# Patient Record
Sex: MALE | Race: WHITE | URBAN - METROPOLITAN AREA
[De-identification: names, ages, dates, MRNs, and addresses within clinical notes are randomized per-mention and may not be internally consistent; named-entity substitution may affect disease eponyms.]

---

## 2017-01-10 ENCOUNTER — OFFICE VISIT (OUTPATIENT)
Dept: FAMILY MEDICINE CLINIC | Age: 57
End: 2017-01-10

## 2017-01-10 VITALS
TEMPERATURE: 97 F | DIASTOLIC BLOOD PRESSURE: 72 MMHG | HEART RATE: 86 BPM | BODY MASS INDEX: 21.4 KG/M2 | HEIGHT: 72 IN | RESPIRATION RATE: 20 BRPM | WEIGHT: 158 LBS | OXYGEN SATURATION: 96 % | SYSTOLIC BLOOD PRESSURE: 116 MMHG

## 2017-01-10 DIAGNOSIS — I10 ESSENTIAL HYPERTENSION: ICD-10-CM

## 2017-01-10 DIAGNOSIS — J44.1 COPD WITH EXACERBATION (HCC): ICD-10-CM

## 2017-01-10 DIAGNOSIS — G89.29 CHRONIC MIDLINE LOW BACK PAIN WITH SCIATICA, SCIATICA LATERALITY UNSPECIFIED: Primary | ICD-10-CM

## 2017-01-10 DIAGNOSIS — M54.40 CHRONIC MIDLINE LOW BACK PAIN WITH SCIATICA, SCIATICA LATERALITY UNSPECIFIED: Primary | ICD-10-CM

## 2017-01-10 RX ORDER — BENAZEPRIL HYDROCHLORIDE AND HYDROCHLOROTHIAZIDE 20; 12.5 MG/1; MG/1
1 TABLET ORAL DAILY
Qty: 30 TAB | Refills: 5 | Status: CANCELLED | OUTPATIENT
Start: 2017-01-10

## 2017-01-10 RX ORDER — ALBUTEROL SULFATE 90 UG/1
2 AEROSOL, METERED RESPIRATORY (INHALATION)
Qty: 1 INHALER | Refills: 5 | Status: SHIPPED | OUTPATIENT
Start: 2017-01-10 | End: 2017-09-22 | Stop reason: SDUPTHER

## 2017-01-10 RX ORDER — AMLODIPINE BESYLATE 5 MG/1
5 TABLET ORAL DAILY
Qty: 30 TAB | Refills: 5 | Status: SHIPPED | OUTPATIENT
Start: 2017-01-10 | End: 2017-08-12 | Stop reason: SDUPTHER

## 2017-01-10 RX ORDER — OXYCODONE AND ACETAMINOPHEN 10; 325 MG/1; MG/1
1 TABLET ORAL
Qty: 60 TAB | Refills: 0 | Status: SHIPPED | OUTPATIENT
Start: 2017-01-10 | End: 2017-02-09 | Stop reason: SDUPTHER

## 2017-01-10 RX ORDER — GABAPENTIN 300 MG/1
CAPSULE ORAL
Qty: 90 CAP | Refills: 5 | Status: SHIPPED | OUTPATIENT
Start: 2017-01-10 | End: 2017-09-22 | Stop reason: SDUPTHER

## 2017-01-10 NOTE — PROGRESS NOTES
Dell Burger 49 y.o. male   Chief Complaint   Patient presents with    Follow-up     2 month f/u    Hypertension    COPD    Pain (Chronic)     Pain scale today 7/10    Medication Refill    Medication Evaluation     Discuss dosage increase of Gabapentin         1. Have you been to the ER, urgent care clinic since your last visit? Hospitalized since your last visit? No    2. Have you seen or consulted any other health care providers outside of the [address] [place] [address] since your last visit? Include any pap smears or colon screening.  No

## 2017-01-10 NOTE — PROGRESS NOTES
HISTORY OF PRESENT ILLNESS  Ana Rosa Franz is a 49 y.o. male. Chief Complaint   Patient presents with    Follow-up     2 month f/u    Hypertension    COPD    Pain (Chronic)     Pain scale today 7/10    Medication Refill    Medication Evaluation     Discuss dosage increase of Gabapentin       HPI  Pt is here for follow up of COPD,HTN,and chronic pain. Pt does need medication refills today. Pt requests electronic refills. New concerns today: Pt would like to increase the dosage of the gabapentin. He has been taking gabapentin 600mg qhs. It make a big difference in his sx in his feet. This is very noticeable if he forgets a dose. Pt states that he has decreased his smoking dramatically. He is down from 2 ppd in early Nov 2016 to just 2-3 cigarettes per day. Some days he does not smoke at all. Pt reports that his breathing is much better with his current meds. He is using albuterol regularly but has not been using the advair due to cost.      Review of Systems   Constitutional: Negative. HENT: Negative. Respiratory: Negative. Cardiovascular: Negative. Musculoskeletal: Positive for back pain. Radiation of pain from back to legs   All other systems reviewed and are negative. Physical Exam  Nursing note and vitals reviewed. Constitutional: He is oriented to person, place, and time. He appears well-developed and well-nourished. HENT:   Head: Normocephalic and atraumatic. Right Ear: External ear normal.   Left Ear: External ear normal.   Nose: Nose normal.   Eyes: Conjunctivae and EOM are normal.   Neck: Normal range of motion. Neck supple. No JVD present. Carotid bruit is not present. No thyromegaly present. Cardiovascular: Normal rate, regular rhythm, normal heart sounds and intact distal pulses. Exam reveals no gallop and no friction rub. No murmur heard. Pulmonary/Chest: Effort normal and breath sounds normal. He has no wheezes. He has no rhonchi.  He has no rales. Abdominal: Soft. Musculoskeletal: Normal range of motion. Neurological: He is alert and oriented to person, place, and time. Coordination normal.   Skin: Skin is warm and dry. Psychiatric: He has a normal mood and affect. His behavior is normal. Judgment and thought content normal.       ASSESSMENT and PLAN  Dian Leon was seen today for follow-up, hypertension, copd, pain (chronic), medication refill and medication evaluation. Diagnoses and all orders for this visit:    Chronic midline low back pain with sciatica, sciatica laterality unspecified  -     oxyCODONE-acetaminophen (PERCOCET 10)  mg per tablet; Take 1 Tab by mouth two (2) times daily as needed for Pain. Max Daily Amount: 2 Tabs. -     gabapentin (NEURONTIN) 300 mg capsule; Take 300mg by mouth each morning; take 600mg by mouth each evening. COPD with exacerbation ([address]  -     VENTOLIN HFA 90 mcg/actuation inhaler; Take 2 Puffs by inhalation every four (4) hours as needed for Wheezing. Will research more affordable tx options for pt. Essential hypertension  -     amLODIPine (NORVASC) 5 mg tablet; Take 1 Tab by mouth daily. Stable, cont pres tx plan.            Follow-up Disposition: 1 month; sooner prn

## 2017-02-09 ENCOUNTER — OFFICE VISIT (OUTPATIENT)
Dept: FAMILY MEDICINE CLINIC | Age: 57
End: 2017-02-09

## 2017-02-09 VITALS
RESPIRATION RATE: 20 BRPM | WEIGHT: 156 LBS | HEIGHT: 72 IN | BODY MASS INDEX: 21.13 KG/M2 | TEMPERATURE: 98.1 F | SYSTOLIC BLOOD PRESSURE: 143 MMHG | DIASTOLIC BLOOD PRESSURE: 76 MMHG | OXYGEN SATURATION: 94 % | HEART RATE: 88 BPM

## 2017-02-09 DIAGNOSIS — M54.40 CHRONIC MIDLINE LOW BACK PAIN WITH SCIATICA, SCIATICA LATERALITY UNSPECIFIED: Primary | ICD-10-CM

## 2017-02-09 DIAGNOSIS — Z72.0 TOBACCO USE: ICD-10-CM

## 2017-02-09 DIAGNOSIS — J44.9 CHRONIC OBSTRUCTIVE PULMONARY DISEASE, UNSPECIFIED COPD TYPE (HCC): ICD-10-CM

## 2017-02-09 DIAGNOSIS — G89.29 CHRONIC MIDLINE LOW BACK PAIN WITH SCIATICA, SCIATICA LATERALITY UNSPECIFIED: Primary | ICD-10-CM

## 2017-02-09 DIAGNOSIS — I10 ESSENTIAL HYPERTENSION: ICD-10-CM

## 2017-02-09 DIAGNOSIS — Z23 ENCOUNTER FOR IMMUNIZATION: ICD-10-CM

## 2017-02-09 RX ORDER — VARENICLINE TARTRATE 25 MG
KIT ORAL
Qty: 1 DOSE PACK | Refills: 0 | Status: SHIPPED | OUTPATIENT
Start: 2017-02-09 | End: 2017-03-16 | Stop reason: ALTCHOICE

## 2017-02-09 RX ORDER — OXYCODONE AND ACETAMINOPHEN 10; 325 MG/1; MG/1
1 TABLET ORAL
Qty: 60 TAB | Refills: 0 | Status: SHIPPED | OUTPATIENT
Start: 2017-02-09 | End: 2017-03-07 | Stop reason: SDUPTHER

## 2017-02-09 NOTE — PROGRESS NOTES
Chief Complaint   Patient presents with    Pain (Chronic)     back pain, states pain med is effective, has pt with pain management 3/16/17    Hypertension    COPD     HISTORY OF PRESENT ILLNESS  Sina Jose is a 49 y.o. male. HPI  Pt here for f/u. Pt reports that he recently had the flu. He has improved. Pt is still smoking 2-3 cigarettes per day but is having trouble stopping. He would like to try chantix. He has good support at home from his wife as she stopped smoking about 1 yr ago. Pt will see pain management next month. Pt is taking the gabapentin as rx'ed. He notes very good improvement in the sx in his feet. He does need a refill of the percocet for his back pain. ROS  Review of Systems   Constitutional: Negative. HENT: Negative. Respiratory: Negative. Cardiovascular: Negative. All other systems reviewed and are negative. Physical Exam  Nursing note and vitals reviewed. Constitutional: He is oriented to person, place, and time. He appears well-developed and well-nourished. HENT:   Head: Normocephalic and atraumatic. Right Ear: External ear normal.   Left Ear: External ear normal.   Nose: Nose normal.   Eyes: Conjunctivae and EOM are normal.   Neck: Normal range of motion. Neck supple. No JVD present. Carotid bruit is not present. No thyromegaly present. Cardiovascular: Normal rate, regular rhythm, normal heart sounds and intact distal pulses. Exam reveals no gallop and no friction rub. No murmur heard. Pulmonary/Chest: Effort normal and breath sounds normal. He has no wheezes. He has no rhonchi. He has no rales. Abdominal: Soft. Musculoskeletal: Normal range of motion. Neurological: He is alert and oriented to person, place, and time. Coordination normal.   Skin: Skin is warm and dry. Psychiatric: He has a normal mood and affect.  His behavior is normal. Judgment and thought content normal.       ASSESSMENT and PLAN  Ane Lobe was seen today for pain (chronic), hypertension and copd. Diagnoses and all orders for this visit:    Chronic midline low back pain with sciatica, sciatica laterality unspecified  -     oxyCODONE-acetaminophen (PERCOCET 10)  mg per tablet; Take 1 Tab by mouth two (2) times daily as needed for Pain. Max Daily Amount: 2 Tabs. Chronic obstructive pulmonary disease, unspecified COPD type ([facility]  Stable, cont pres tx plan. Essential hypertension  Stable, cont pres tx plan. Tobacco use  -     varenicline (CHANTIX STARTER YULISA) 0.5 mg (11)- 1 mg (42) DsPk; Use as directed. Encounter for immunization  -     Influenza virus vaccine (QUADRIVALENT PRES FREE SYRINGE) IM 3 years and older  -     Pneumococcal polysaccharide vaccine, 23-valent, adult or immunosuppressed pt dose      Follow-up Disposition: pt to call for appt when he decides on his quit date so that he has a visit prior to running out of chantix.

## 2017-02-09 NOTE — PROGRESS NOTES
Chief Complaint   Patient presents with    Pain (Chronic)     back pain, states pain med is effective, has pt with pain management 3/16/17    Hypertension    COPD     1. Have you been to the ER, urgent care clinic since your last visit? Hospitalized since your last visit? Yes When: 3 weeks ago Where: [facility] Reason for visit: influenza    2. Have you seen or consulted any other health care providers outside of the [address] [place] [address] since your last visit? Include any pap smears or colon screening. No  Patient given VIS information and signed consent form. Immunization/s administered, patient tolerated procedure well. There was no reaction noted after observed for 5 minutes.

## 2017-03-07 DIAGNOSIS — G89.29 CHRONIC MIDLINE LOW BACK PAIN WITH SCIATICA, SCIATICA LATERALITY UNSPECIFIED: ICD-10-CM

## 2017-03-07 DIAGNOSIS — M54.40 CHRONIC MIDLINE LOW BACK PAIN WITH SCIATICA, SCIATICA LATERALITY UNSPECIFIED: ICD-10-CM

## 2017-03-07 RX ORDER — OXYCODONE AND ACETAMINOPHEN 10; 325 MG/1; MG/1
1 TABLET ORAL
Qty: 30 TAB | Refills: 0 | Status: SHIPPED | OUTPATIENT
Start: 2017-03-07

## 2017-03-16 ENCOUNTER — OFFICE VISIT (OUTPATIENT)
Dept: PAIN MANAGEMENT | Age: 57
End: 2017-03-16

## 2017-03-16 VITALS
BODY MASS INDEX: 21.84 KG/M2 | WEIGHT: 161 LBS | SYSTOLIC BLOOD PRESSURE: 151 MMHG | HEART RATE: 79 BPM | DIASTOLIC BLOOD PRESSURE: 91 MMHG

## 2017-03-16 DIAGNOSIS — G89.4 CHRONIC PAIN SYNDROME: ICD-10-CM

## 2017-03-16 DIAGNOSIS — G62.9 NEUROPATHY: ICD-10-CM

## 2017-03-16 DIAGNOSIS — M54.41 CHRONIC MIDLINE LOW BACK PAIN WITH BILATERAL SCIATICA: Primary | ICD-10-CM

## 2017-03-16 DIAGNOSIS — M79.2 NEUROPATHIC PAIN: ICD-10-CM

## 2017-03-16 DIAGNOSIS — M15.9 GENERALIZED OA: ICD-10-CM

## 2017-03-16 DIAGNOSIS — M54.41 CHRONIC MIDLINE LOW BACK PAIN WITH BILATERAL SCIATICA: ICD-10-CM

## 2017-03-16 DIAGNOSIS — G89.29 CHRONIC MIDLINE LOW BACK PAIN WITH BILATERAL SCIATICA: ICD-10-CM

## 2017-03-16 DIAGNOSIS — I10 ESSENTIAL HYPERTENSION: ICD-10-CM

## 2017-03-16 DIAGNOSIS — Z79.899 ENCOUNTER FOR LONG-TERM (CURRENT) USE OF MEDICATIONS: ICD-10-CM

## 2017-03-16 DIAGNOSIS — G89.29 CHRONIC MIDLINE LOW BACK PAIN WITH BILATERAL SCIATICA: Primary | ICD-10-CM

## 2017-03-16 DIAGNOSIS — I73.9 INTERMITTENT CLAUDICATION (HCC): ICD-10-CM

## 2017-03-16 DIAGNOSIS — M47.27 OSTEOARTHRITIS OF SPINE WITH RADICULOPATHY, LUMBOSACRAL REGION: ICD-10-CM

## 2017-03-16 DIAGNOSIS — J43.9 PULMONARY EMPHYSEMA, UNSPECIFIED EMPHYSEMA TYPE (HCC): ICD-10-CM

## 2017-03-16 DIAGNOSIS — M54.42 CHRONIC MIDLINE LOW BACK PAIN WITH BILATERAL SCIATICA: ICD-10-CM

## 2017-03-16 DIAGNOSIS — M25.50 POLYARTHRALGIA: ICD-10-CM

## 2017-03-16 DIAGNOSIS — M54.42 CHRONIC MIDLINE LOW BACK PAIN WITH BILATERAL SCIATICA: Primary | ICD-10-CM

## 2017-03-16 LAB
ALCOHOL UR POC: NORMAL
AMPHETAMINES UR POC: NEGATIVE
BARBITURATES UR POC: NEGATIVE
BENZODIAZEPINES UR POC: NEGATIVE
BUPRENORPHINE UR POC: NORMAL
CANNABINOIDS UR POC: NEGATIVE
CARISOPRODOL UR POC: NORMAL
COCAINE UR POC: NEGATIVE
FENTANYL UR POC: NORMAL
MDMA/ECSTASY UR POC: NEGATIVE
METHADONE UR POC: NEGATIVE
METHAMPHETAMINE UR POC: NEGATIVE
METHYLPHENIDATE UR POC: NEGATIVE
OPIATES UR POC: NEGATIVE
OXYCODONE UR POC: NORMAL
PHENCYCLIDINE UR POC: NEGATIVE
PROPOXYPHENE UR POC: NORMAL
TRAMADOL UR POC: NORMAL
TRICYCLICS UR POC: NEGATIVE

## 2017-03-16 RX ORDER — OXYCODONE AND ACETAMINOPHEN 10; 325 MG/1; MG/1
1 TABLET ORAL
Qty: 120 TAB | Refills: 0 | Status: SHIPPED | OUTPATIENT
Start: 2017-03-16 | End: 2017-04-15

## 2017-03-16 RX ORDER — OXYCODONE AND ACETAMINOPHEN 10; 325 MG/1; MG/1
1 TABLET ORAL
Qty: 120 TAB | Refills: 0 | Status: SHIPPED | OUTPATIENT
Start: 2017-04-14 | End: 2017-05-14

## 2017-03-16 RX ORDER — DICLOFENAC SODIUM 75 MG/1
75 TABLET, DELAYED RELEASE ORAL 2 TIMES DAILY
Qty: 60 TAB | Refills: 5 | Status: SHIPPED | OUTPATIENT
Start: 2017-03-16 | End: 2017-04-15

## 2017-03-16 NOTE — MR AVS SNAPSHOT
Visit Information Date & Time Provider Department Dept. Phone Encounter #  
 3/16/2017  9:20 AM Henny Naylor MD [address] [place] [address] for Pain Management   Follow-up Instructions Return in about 2 months (around 5/16/2017). Upcoming Health Maintenance Date Due Hepatitis C Screening 1960 DTaP/Tdap/Td series (1 - Tdap) 3/31/1983 FOBT Q 1 YEAR AGE 50-75 3/31/2012 Allergies as of 3/16/2017  Review Complete On: 3/16/2017 By: Esteban Hernandez RN No Known Allergies Current Immunizations  Never Reviewed Name Date Influenza Vaccine (Quad) PF 2/9/2017 Pneumococcal Polysaccharide (PPSV-23) 2/9/2017 Not reviewed this visit You Were Diagnosed With   
  
 Codes Comments Encounter for long-term (current) use of medications    -  Primary ICD-10-CM: E80.125 ICD-9-CM: V58.69 Chronic midline low back pain with bilateral sciatica     ICD-10-CM: M54.41, M54.42, G89.29 ICD-9-CM: 724.2, 724.3, 338.29 Osteoarthritis of spine with radiculopathy, lumbosacral region     ICD-10-CM: M47.27 ICD-9-CM: 721.3 Pulmonary emphysema, unspecified emphysema type ([facility]     ICD-10-CM: J43.9 ICD-9-CM: 492.8 Essential hypertension     ICD-10-CM: I10 
ICD-9-CM: 401.9 Polyarthralgia     ICD-10-CM: M25.50 ICD-9-CM: 719.49 Generalized OA     ICD-10-CM: M15.9 ICD-9-CM: 715.00 Neuropathy     ICD-10-CM: G62.9 ICD-9-CM: 355.9 Neuropathic pain     ICD-10-CM: M79.2 ICD-9-CM: 729.2 Chronic pain syndrome     ICD-10-CM: G89.4 ICD-9-CM: 338.4 Intermittent claudication (HCC)     ICD-10-CM: I73.9 ICD-9-CM: 443. 9 Vitals BP Pulse Weight(growth percentile) BMI Smoking Status (!) 151/91 79 161 lb (73 kg) 21.84 kg/m2 Current Every Day Smoker BMI and BSA Data Body Mass Index Body Surface Area  
 21.84 kg/m 2 1.93 m 2 Preferred Pharmacy Pharmacy Name Phone [facility] [address] [place] [address] Your Updated Medication List  
  
   
This list is accurate as of: 3/16/17 11:03 AM.  Always use your most recent med list.  
  
  
  
  
 * albuterol 2.5 mg /3 mL (0.083 %) nebulizer solution Commonly known as:  PROVENTIL VENTOLIN  
3 mL by Nebulization route every four (4) hours as needed for Wheezing. * VENTOLIN HFA 90 mcg/actuation inhaler Generic drug:  albuterol Take 2 Puffs by inhalation every four (4) hours as needed for Wheezing. amLODIPine 5 mg tablet Commonly known as:  Shae Blade Take 1 Tab by mouth daily. budesonide 180 mcg/actuation Aepb inhaler Commonly known as:  PULMICORT Take 2 Puffs by inhalation two (2) times a day. diclofenac EC 75 mg EC tablet Commonly known as:  VOLTAREN Take 1 Tab by mouth two (2) times a day for 30 days. Indications: OSTEOARTHRITIS  
  
 gabapentin 300 mg capsule Commonly known as:  NEURONTIN Take 300mg by mouth each morning; take 600mg by mouth each evening. OTHER Lumbar DDS Traction Belt * oxyCODONE-acetaminophen  mg per tablet Commonly known as:  PERCOCET 10 Take 1 Tab by mouth two (2) times daily as needed for Pain. Max Daily Amount: 2 Tabs. * oxyCODONE-acetaminophen  mg per tablet Commonly known as:  PERCOCET Take 1 Tab by mouth four (4) times daily as needed for Pain for up to 30 days. Max Daily Amount: 4 Tabs. Indications: Pain * oxyCODONE-acetaminophen  mg per tablet Commonly known as:  PERCOCET Take 1 Tab by mouth four (4) times daily as needed for Pain for up to 30 days. Max Daily Amount: 4 Tabs. Indications: Pain Start taking on:  4/14/2017 TENS unit and electrodes Cmpk 1 Device by Does Not Apply route as needed for up to 30 days. * Notice: This list has 5 medication(s) that are the same as other medications prescribed for you.  Read the directions carefully, and ask your doctor or other care provider to review them with you. Prescriptions Printed Refills TENS unit and electrodes cmpk prn Si Device by Does Not Apply route as needed for up to 30 days. Class: Print Route: Does Not Apply OTHER prn Sig: Lumbar DDS Traction Belt Class: Print  
 oxyCODONE-acetaminophen (PERCOCET)  mg per tablet 0 Starting on: 2017 Sig: Take 1 Tab by mouth four (4) times daily as needed for Pain for up to 30 days. Max Daily Amount: 4 Tabs. Indications: Pain Class: Print Route: Oral  
 oxyCODONE-acetaminophen (PERCOCET)  mg per tablet 0 Sig: Take 1 Tab by mouth four (4) times daily as needed for Pain for up to 30 days. Max Daily Amount: 4 Tabs. Indications: Pain Class: Print Route: Oral  
  
Prescriptions Sent to Pharmacy Refills  
 diclofenac EC (VOLTAREN) 75 mg EC tablet 5 Sig: Take 1 Tab by mouth two (2) times a day for 30 days. Indications: OSTEOARTHRITIS Class: Normal  
 Pharmacy: [address], [place] Ph #: [phone] Route: Oral  
  
We Performed the Following AMB POC DRUG SCREEN () [ [facility]] DRUG SCREEN [[id number] Custom] Follow-up Instructions Return in about 2 months (around 2017). To-Do List   
 Around 2017 Lab:  ADRIEN, DIRECT, W/REFLEX Around 2017 Lab:  ANGIOTENSIN CONVERTING ENZYME Around 2017 Lab:  COMPLEMENT, C3 Around 2017 Lab:  COMPLEMENT, C4 Around 2017 Lab:  COMPLEMENT, CH50, TOTAL Around 2017 Lab:  CRP, HIGH SENSITIVITY Around 2017 Lab:  CYCLIC CITRUL PEPTIDE AB, IGG Around 2017 Lab:  AMINAH + DSDNA Around 2017 Lab:  HEPATITIS C AB Around 2017 Lab:  HLA-B27 Around 2017 Lab:  INTERLEUKIN 6 Around 2017 Imaging:  LOWER EXT ART PVR MULT LEVEL SEG PRESSURES Around 03/16/2017 Lab:  LYME AB TOTAL W/RFLX W BLOT Around 03/16/2017 Imaging:  NM BONE SCAN [facility] BODY Around 03/16/2017 Lab:  RHEUMATOID FACTOR, QL Around 03/16/2017 Lab:  SED RATE (ESR) Around 03/16/2017 Lab:  T4 Around 03/16/2017 Lab:  TSH 3RD GENERATION Around 03/16/2017 Lab:  VITAMIN B12 & FOLATE Referral Information Referral ID Referred By Referred To  
  
 [id number] Waqar Valdivia Not Available Visits Status Start Date End Date 1 New Request 3/16/17 3/16/18 If your referral has a status of pending review or denied, additional information will be sent to support the outcome of this decision. Patient Instructions Current health maintenance issues were reviewed and the patient was advised to followup with his/her PCP for completion of these items. Introducing [facility]! Miranda Middleton introduces NextUser patient portal. Now you can access parts of your medical record, email your doctor's office, and request medication refills online. 1. In your internet browser, go to [email or web address] 2. Click on the First Time User? Click Here link in the Sign In box. You will see the New Member Sign Up page. 3. Enter your NextUser Access Code exactly as it appears below. You will not need to use this code after youve completed the sign-up process. If you do not sign up before the expiration date, you must request a new code. · NextUser Access Code: [email or web address] Expires: 5/10/2017 11:04 AM 
 
4. Enter the last four digits of your Social Security Number (xxxx) and Date of Birth (mm/dd/yyyy) as indicated and click Submit. You will be taken to the next sign-up page. 5. Create a NextUser ID. This will be your NextUser login ID and cannot be changed, so think of one that is secure and easy to remember. 6. Create a Pulse password. You can change your password at any time. 7. Enter your Password Reset Question and Answer. This can be used at a later time if you forget your password. 8. Enter your e-mail address. You will receive e-mail notification when new information is available in [address] 9. Click Sign Up. You can now view and download portions of your medical record. 10. Click the Download Summary menu link to download a portable copy of your medical information. If you have questions, please visit the Frequently Asked Questions section of the Pulse website. Remember, Pulse is NOT to be used for urgent needs. For medical emergencies, dial 911. Now available from your iPhone and Android! Please provide this summary of care documentation to your next provider. Lyme Disease Testing Disclaimer:   
 § 27.4-8426.9. (Expires July 1, 2018) Lyme disease testing information disclosure. A. Every licensee or his in-office designee who orders a laboratory test for the presence of Lyme disease shall provide to the patient or his legal representative the following written information: \"ACCORDING TO THE CENTERS FOR DISEASE CONTROL AND PREVENTION, AS OF 2011 LYME DISEASE IS THE SIXTH FASTEST GROWING DISEASE IN THE [place]. YOUR HEALTH CARE PROVIDER HAS ORDERED A LABORATORY TEST FOR THE PRESENCE OF LYME DISEASE FOR YOU. CURRENT LABORATORY TESTING FOR LYME DISEASE CAN BE PROBLEMATIC AND STANDARD LABORATORY TESTS OFTEN RESULT IN FALSE NEGATIVE AND FALSE POSITIVE RESULTS, AND IF DONE TOO EARLY, YOU MAY NOT HAVE PRODUCED ENOUGH ANTIBODIES TO BE CONSIDERED POSITIVE BECAUSE YOUR IMMUNE RESPONSE REQUIRES TIME TO DEVELOP ANTIBODIES. IF YOU ARE TESTED FOR LYME DISEASE, AND THE RESULTS ARE NEGATIVE, THIS DOES NOT NECESSARILY MEAN YOU DO NOT HAVE LYME DISEASE.  IF YOU CONTINUE TO EXPERIENCE SYMPTOMS, YOU SHOULD CONTACT YOUR HEALTH CARE PROVIDER AND INQUIRE ABOUT THE APPROPRIATENESS OF RETESTING OR ADDITIONAL TREATMENT. \"  
B. Licensees shall be immune from civil liability for the provision of the written information required by this section absent gross negligence or willful misconduct. Your primary care clinician is listed as Governor Babar. If you have any questions after today's visit, please call [phone].

## 2017-03-16 NOTE — PROGRESS NOTES
HISTORY OF PRESENT ILLNESS  Slava Iglesias is a 49 y.o. male. Left handed  HPI he is seen in [facility]. He is referred for evaluation treatment of multiple pain complaints including low back pain which is predominantly nonradiating, distal lower extremity pain associated with numbness and tingling, and generalized polyarticular pain. Extensive external medical records pertaining to his prior treatment were reviewed. He relates the onset of his low back problems to an injury which she sustained many many years ago when he was pushing a boat off of a sand bar. Over the course of the many years he has been treated with physical therapy epidural steroid injections's and sacroiliac joint injections which have not provided any benefit. He has undergone an MRI of the lumbar spine on 5/30/14 which was remarkable for the following: At L3-4, there is a broad disc bulge with narrowing of the neural canal.  At L4-5, there is a broad disc bulge with small central disc protrusion and mild bilateral neuroforaminal stenosis. At L5-S1 there is a central disc protrusion with osteophytosis. An EMG and nerve conduction study of the lumbar spine and lower extremities was also reviewed and felt consistent with bilateral lumbar radiculopathy. He had been treated by a local orthopedist  He reports that his pain is continuous throughout the day and is alleviated by doing home exercises. He has been treated with multiple medications in the past with  Little success including hydrocodone Flexeril Mobic Lidoderm compound ointments. He does relate having some relief with the use of Percocet and does find that gabapentin has been helpful.     Associated symptoms: Fatigue, snoring, blurry vision, hearing loss, vertigo, imbalance, cough, shortness of breath, cold hands and feet, erectile dysfunction, numbness and tingling in the legs, incoordination and difficulty walking, joint pain.    Past history: COPD, hypertension, question of neuropathy of uncertain etiology. Family history: Diabetes, hypertension, seizures. Social history: He is  and is employed full-time doing automotive work. He is a 3-4 cigarettes per day smoker and a rare drinker. Denies any current or recent recreational substance usage. Low risk is identified on the opioid risk screening tool. On the PHQ-9, no significant depressive affect is noted. A PCS score of 10 is not consistent with significant catastrophic behaviors. A review of the [place] prescription monitoring program does not identify any inconsistency. UDS obtained and reviewed; formal confirmation from laboratory is pending. A salivary fluid specimen is obtained and forwarded to the laboratory for cytogenetic analysis    Review of Systems   Constitutional: Positive for malaise/fatigue. Gastrointestinal: Positive for constipation, heartburn and nausea. Musculoskeletal: Positive for back pain, joint pain (polyarticular), myalgias and neck pain. Neurological: Positive for weakness (generalized). Psychiatric/Behavioral: The patient has insomnia. All other systems reviewed and are negative. Physical Exam   Constitutional: He is oriented to person, place, and time. He appears distressed. HENT:   Head: Normocephalic and atraumatic. Right Ear: External ear normal.   Left Ear: External ear normal.   Nose: Nose normal.   Mouth/Throat: Oropharynx is clear and moist. No oropharyngeal exudate. Eyes: Conjunctivae and EOM are normal. Pupils are equal, round, and reactive to light. Right eye exhibits no discharge. Left eye exhibits no discharge. No scleral icterus. Neck: Spinous process tenderness and muscular tenderness (spasms) present. Decreased range of motion present. No thyromegaly present. Cardiovascular: Normal rate, regular rhythm and normal heart sounds.     Pulmonary/Chest: Effort normal and breath sounds normal. No respiratory distress. He has no wheezes. He has no rales. Abdominal: Soft. He exhibits no distension. There is no tenderness. There is no rebound and no guarding. Musculoskeletal: He exhibits tenderness. Right shoulder: He exhibits decreased range of motion, tenderness and pain. Left shoulder: He exhibits decreased range of motion, tenderness and pain. Right elbow: He exhibits decreased range of motion. Tenderness (diffuse) found. Left elbow: He exhibits decreased range of motion. Tenderness (crepitus) found. Right wrist: He exhibits decreased range of motion, tenderness, bony tenderness and crepitus. Left wrist: He exhibits decreased range of motion, tenderness, bony tenderness and crepitus. Right knee: He exhibits decreased range of motion (crepitus) and bony tenderness. Left knee: He exhibits decreased range of motion (crepitus) and bony tenderness. Right ankle: He exhibits decreased range of motion. Tenderness. Left ankle: He exhibits decreased range of motion. Tenderness. Lumbar back: He exhibits decreased range of motion, tenderness, pain and spasm. Neurological: He is alert and oriented to person, place, and time. He has normal reflexes. No cranial nerve deficit or sensory deficit. He exhibits normal muscle tone. Gait abnormal. Coordination normal.   Reflex Scores:       Tricep reflexes are 2+ on the right side and 2+ on the left side. Bicep reflexes are 2+ on the right side and 2+ on the left side. Brachioradialis reflexes are 2+ on the right side and 2+ on the left side. Patellar reflexes are 2+ on the right side and 2+ on the left side. Achilles reflexes are 2+ on the right side and 2+ on the left side. Skin: Skin is warm. No rash noted. Psychiatric: He has a normal mood and affect. His behavior is normal. Judgment and thought content normal.   Nursing note and vitals reviewed.       ASSESSMENT and PLAN  Encounter Diagnoses   Name Primary?  Encounter for long-term (current) use of medications Yes    Chronic midline low back pain with bilateral sciatica     Osteoarthritis of spine with radiculopathy, lumbosacral region     Pulmonary emphysema, unspecified emphysema type ([address]     Essential hypertension     Polyarthralgia     Generalized OA     Neuropathy     Neuropathic pain     Chronic pain syndrome     Intermittent claudication [facility])      Laboratory assessment for underlying rheumatologic illness as well as a bone scan will be arranged. Because of his symptoms suggestive of intermittent claudication, lower extremity arterial Dopplers will be arranged. A DDS lumbar traction belt and a TENS unit will be obtained to help with his musculoskeletal symptoms. Interventions will begin with bilateral medial branch blocks L3, 4, 5 progressing to radiofrequency ablation if successful. Gabapentin will be increased to 300 mg 3 times daily   Percocet, 10/325, will be allowed up to 4 times daily as needed for his chronic pain  Voltaren EC, 35 mg, twice daily will be initiated for his arthritic symptomatology. Further recommendations will be based upon the results of the above. 1. Pain medications are prescribed with the objective of pain relief and improved physical and psychosocial function in this patient. 2. Counseled patient on proper use of prescribed medications and reviewed opioid contract. 3. Counseled patient about chronic medical conditions and their relationship to anxiety and depression and recommended mental health support as needed. 4. Reviewed with patient self-help tools, home exercise, and lifestyle changes to assist the patient in self-management of symptoms. 5. Advised patient to have a primary care provider to continue care for health maintenance and general medical conditions and support for referral to specialty care as needed.   6. Reviewed with patient the treatment plan, goals of treatment plan, and limitations of treatment plan, to include the potential for side effects from medications and procedures. If side effects occur, it is the responsibility of the patient to inform the clinic so that a change in the treatment plan can be made in a safe manner. The patient is advised that stopping prescribed medication may cause an increase in symptoms and possible medication withdrawal symptoms. The patient is informed an emergency room evaluation may be necessary if this occurs. DISPOSITION: The patients condition and plan were discussed at length and all questions were answered. The patient agrees with the plan.     Counseling occupied > 50% of visit:  Total time: 65 minutes

## 2017-03-16 NOTE — PROGRESS NOTES
Pt here for NP appt. UDS with genetics done. Contract reviewed,signed and pt given copy.  Pt advised of alcohol policy

## 2017-03-17 ENCOUNTER — DOCUMENTATION ONLY (OUTPATIENT)
Dept: PAIN MANAGEMENT | Age: 57
End: 2017-03-17

## 2017-03-17 NOTE — PROGRESS NOTES
Paperwork for imaging orders faxed to [facility] for scheduling at [facility]. Tens unit faxed to [facility]. Back brace paperwork faxed to [facility]. They will schedule directly with pt.

## 2017-03-22 RX ORDER — DIAZEPAM 5 MG/1
10 TABLET ORAL ONCE
Status: CANCELLED | OUTPATIENT
Start: 2017-03-27 | End: 2017-03-28

## 2017-03-22 RX ORDER — SODIUM CHLORIDE 0.9 % (FLUSH) 0.9 %
5-10 SYRINGE (ML) INJECTION AS NEEDED
Status: CANCELLED | OUTPATIENT
Start: 2017-03-27

## 2017-03-28 ENCOUNTER — DOCUMENTATION ONLY (OUTPATIENT)
Dept: PAIN MANAGEMENT | Age: 57
End: 2017-03-28

## 2017-03-30 DIAGNOSIS — I73.9 INTERMITTENT CLAUDICATION (HCC): ICD-10-CM

## 2017-03-30 DIAGNOSIS — M47.27 OSTEOARTHRITIS OF SPINE WITH RADICULOPATHY, LUMBOSACRAL REGION: ICD-10-CM

## 2017-03-30 DIAGNOSIS — G62.9 NEUROPATHY: ICD-10-CM

## 2017-03-30 DIAGNOSIS — G89.29 CHRONIC MIDLINE LOW BACK PAIN WITH BILATERAL SCIATICA: ICD-10-CM

## 2017-03-30 DIAGNOSIS — M79.2 NEUROPATHIC PAIN: ICD-10-CM

## 2017-03-30 DIAGNOSIS — M15.9 GENERALIZED OA: ICD-10-CM

## 2017-03-30 DIAGNOSIS — I10 ESSENTIAL HYPERTENSION: ICD-10-CM

## 2017-03-30 DIAGNOSIS — M25.50 POLYARTHRALGIA: ICD-10-CM

## 2017-03-30 DIAGNOSIS — M54.41 CHRONIC MIDLINE LOW BACK PAIN WITH BILATERAL SCIATICA: ICD-10-CM

## 2017-03-30 DIAGNOSIS — G89.4 CHRONIC PAIN SYNDROME: ICD-10-CM

## 2017-03-30 DIAGNOSIS — J43.9 PULMONARY EMPHYSEMA, UNSPECIFIED EMPHYSEMA TYPE (HCC): ICD-10-CM

## 2017-03-30 DIAGNOSIS — M54.42 CHRONIC MIDLINE LOW BACK PAIN WITH BILATERAL SCIATICA: ICD-10-CM

## 2017-03-30 DIAGNOSIS — Z79.899 ENCOUNTER FOR LONG-TERM (CURRENT) USE OF MEDICATIONS: ICD-10-CM

## 2017-06-14 ENCOUNTER — OFFICE VISIT (OUTPATIENT)
Dept: FAMILY MEDICINE CLINIC | Age: 57
End: 2017-06-14

## 2017-06-14 VITALS
OXYGEN SATURATION: 93 % | DIASTOLIC BLOOD PRESSURE: 76 MMHG | WEIGHT: 158 LBS | TEMPERATURE: 98 F | HEART RATE: 88 BPM | BODY MASS INDEX: 21.4 KG/M2 | SYSTOLIC BLOOD PRESSURE: 141 MMHG | HEIGHT: 72 IN

## 2017-06-14 DIAGNOSIS — J06.9 URI WITH COUGH AND CONGESTION: ICD-10-CM

## 2017-06-14 DIAGNOSIS — J44.1 COPD WITH EXACERBATION (HCC): Primary | ICD-10-CM

## 2017-06-14 RX ORDER — DICLOFENAC SODIUM 75 MG/1
75 TABLET, DELAYED RELEASE ORAL 2 TIMES DAILY
COMMUNITY
Start: 2017-05-22

## 2017-06-14 RX ORDER — AMOXICILLIN AND CLAVULANATE POTASSIUM 875; 125 MG/1; MG/1
1 TABLET, FILM COATED ORAL 2 TIMES DAILY
Qty: 14 TAB | Refills: 0 | Status: SHIPPED | OUTPATIENT
Start: 2017-06-14 | End: 2017-06-21

## 2017-06-14 RX ORDER — IPRATROPIUM BROMIDE AND ALBUTEROL SULFATE 2.5; .5 MG/3ML; MG/3ML
3 SOLUTION RESPIRATORY (INHALATION)
Qty: 2 NEBULE | Refills: 0
Start: 2017-06-14 | End: 2017-06-14

## 2017-06-14 RX ORDER — PREDNISONE 10 MG/1
TABLET ORAL
Qty: 21 TAB | Refills: 0 | Status: SHIPPED | OUTPATIENT
Start: 2017-06-14 | End: 2017-09-22 | Stop reason: ALTCHOICE

## 2017-06-14 NOTE — PROGRESS NOTES
HPI  Kain Seth is a 56 y.o. male  Chief Complaint   Patient presents with    Breathing Problem     Pt states that he has been having shortness of breath for 2 weeks. Pt states that he does have a HX of COPD. Pt is unsure if it is due to weather change.  LOW BACK PAIN     Pt states that this is chronic issue. Admits to continual smoking with an increase in shortness of breath. Reports history of COPD with inhaler and nebulizer treatment at home. Reports only using the ventolin. Denies Pulmicort as he says he did not know he was suppose to be using it. Reports albuterol helps when he uses it but states he tries not to use it. Reports some congestion but denies sputum production. Reports change in weather brought his symptoms on. Reports he also has mold in his house that may be causing his symptoms. Reports chest feels tight. Reports having to use his ventolin earlier today. Past Medical History  Past Medical History:   Diagnosis Date    COPD (chronic obstructive pulmonary disease) [address] [address] 2015    Essential hypertension 5/13    new    Hyperlipidemia        Surgical History  History reviewed. No pertinent surgical history. Medications  Current Outpatient Prescriptions   Medication Sig Dispense Refill    diclofenac EC (VOLTAREN) 75 mg EC tablet 75 mg two (2) times a day.  predniSONE (STERAPRED DS) 10 mg dose pack See administration instruction per 10mg dose pack 21 Tab 0    amoxicillin-clavulanate (AUGMENTIN) 875-125 mg per tablet Take 1 Tab by mouth two (2) times a day for 7 days. 14 Tab 0    budesonide (PULMICORT) 180 mcg/actuation aepb inhaler Take 2 Puffs by inhalation two (2) times a day. 1 Inhaler 12    albuterol-ipratropium (DUO-NEB) 2.5 mg-0.5 mg/3 ml nebu 3 mL by Nebulization route now for 1 dose. 2 Nebule 0    VENTOLIN HFA 90 mcg/actuation inhaler Take 2 Puffs by inhalation every four (4) hours as needed for Wheezing.  1 Inhaler 5    amLODIPine (NORVASC) 5 mg tablet Take 1 Tab by mouth daily. 30 Tab 5    gabapentin (NEURONTIN) 300 mg capsule Take 300mg by mouth each morning; take 600mg by mouth each evening. 90 Cap 5    albuterol (PROVENTIL VENTOLIN) 2.5 mg /3 mL (0.083 %) nebulizer solution 3 mL by Nebulization route every four (4) hours as needed for Wheezing. 24 Each 12    OTHER Lumbar DDS Traction Belt 1 Units prn    oxyCODONE-acetaminophen (PERCOCET 10)  mg per tablet Take 1 Tab by mouth two (2) times daily as needed for Pain. Max Daily Amount: 2 Tabs. 30 Tab 0       Allergies  No Known Allergies    Family History  Family History   Problem Relation Age of Onset    Heart Attack Neg Hx     Sudden Death Neg Hx        Social History  Social History     Social History    Marital status:      Spouse name: N/A    Number of children: N/A    Years of education: N/A     Occupational History    Not on file.      Social History Main Topics    Smoking status: Current Every Day Smoker     Packs/day: 1.00    Smokeless tobacco: Not on file    Alcohol use 7.2 oz/week     12 Cans of beer per week      Comment: 2-3 drinks vodka/day- half pint    Drug use: Yes     Special: Marijuana      Comment: rarely    Sexual activity: Yes     Partners: Female     Other Topics Concern    Not on file     Social History Narrative       Problem List  Patient Active Problem List   Diagnosis Code    Chronic midline low back pain with sciatica M54.40, G89.29    Chronic obstructive pulmonary disease ([facility] [address] J44.9    Essential hypertension I10    Tobacco use Z72.0    Encounter for long-term (current) use of medications Z79.899    Chronic midline low back pain with bilateral sciatica M54.41, M54.42, G89.29    Osteoarthritis of spine with radiculopathy, lumbosacral region M47.27    Pulmonary emphysema (HCC) J43.9    Polyarthralgia M25.50    Generalized OA M15.9    Neuropathy G62.9    Neuropathic pain M79.2    Chronic pain syndrome G89.4    Intermittent claudication (HCC) I73.9 Review of Systems  Review of Systems   Constitutional: Negative for chills and fever. HENT: Positive for congestion. Negative for sore throat. Respiratory: Positive for cough, shortness of breath and wheezing. Negative for sputum production. Cardiovascular: Positive for chest pain (chest tightness). Negative for palpitations. Gastrointestinal: Negative for abdominal pain, nausea and vomiting. Musculoskeletal: Positive for back pain (chronic). Vital Signs  Vitals:    06/14/17 1339   BP: 141/76   Pulse: 88   Temp: 98 °F (36.7 °C)   TempSrc: Oral   SpO2: 93%   Weight: 158 lb (71.7 kg)   Height: 6' (1.829 m)   PainSc:   7   PainLoc: Back       Physical Exam  Physical Exam   Constitutional: He is oriented to person, place, and time. He appears distressed. HENT:   Right Ear: Tympanic membrane and ear canal normal.   Left Ear: Tympanic membrane and ear canal normal.   Nose: Rhinorrhea present. No mucosal edema. Right sinus exhibits no maxillary sinus tenderness and no frontal sinus tenderness. Left sinus exhibits no maxillary sinus tenderness and no frontal sinus tenderness. Mouth/Throat: Oropharyngeal exudate (clear drainage) present. Cardiovascular: Normal rate, regular rhythm and normal heart sounds. Exam reveals no friction rub. No murmur heard. Pulmonary/Chest: No respiratory distress. He has decreased breath sounds in the left lower field. He has wheezes in the right upper field, the right middle field, the right lower field, the left upper field, the left middle field and the left lower field. He has rhonchi in the right upper field, the right middle field, the right lower field, the left upper field, the left middle field and the left lower field. Abdominal: Soft. Bowel sounds are normal. He exhibits no distension. Neurological: He is alert and oriented to person, place, and time. Skin: Skin is warm and dry. Vitals reviewed.       Diagnostics  Orders Placed This Encounter  ALBUTEROL IPRATROP NON-COMP     Order Specific Question:   Dose     Answer:   6 ml     Order Specific Question:   Site     Answer:   OTHER     Comments:   inhalation     Order Specific Question:   Expiration Date     Answer:   2018     Order Specific Question:   Lot#     Answer:   [id number]     Order Specific Question:        Answer:   [employer]     Order Specific Question:   Charge Quantity? Answer:   2     Order Specific Question:   Perfomed by/Witnessed by: Answer:   Jerome Finley LPN     Order Specific Question:   NDC#     Answer:   0192-7184-28    MA INHAL RX, AIRWAY OBST/DX SPUTUM INDUCT    diclofenac EC (VOLTAREN) 75 mg EC tablet     Si mg two (2) times a day.  predniSONE (STERAPRED DS) 10 mg dose pack     Sig: See administration instruction per 10mg dose pack     Dispense:  21 Tab     Refill:  0    amoxicillin-clavulanate (AUGMENTIN) 875-125 mg per tablet     Sig: Take 1 Tab by mouth two (2) times a day for 7 days. Dispense:  14 Tab     Refill:  0    budesonide (PULMICORT) 180 mcg/actuation aepb inhaler     Sig: Take 2 Puffs by inhalation two (2) times a day. Dispense:  1 Inhaler     Refill:  12    albuterol-ipratropium (DUO-NEB) 2.5 mg-0.5 mg/3 ml nebu     Sig: 3 mL by Nebulization route now for 1 dose.      Dispense:  2 Nebule     Refill:  0       Results  Results for orders placed or performed in visit on 17   AMB POC DRUG SCREEN ()   Result Value Ref Range    ALCOHOL UR POC      AMPHETAMINES UR POC Negative     CARISOPRODOL UR POC      COCAINE UR POC Negative     FENTANYL UR POC      MDMA/ECSTASY UR POC Negative     METHADONE UR POC Negative     METHAMPHETAMINE UR POC Negative     METHYLPHENIDATE UR POC Negative     OPIATES UR POC Negative     OXYCODONE UR POC Presumptive Positive     PHENCYCLIDINE UR POC Negative     PROPOXYPHENE UR POC      TRAMADOL UR POC      TRICYCLICS UR POC Negative     BARBITURATES UR POC Negative BENZODIAZEPINES UR POC Negative     CANNABINOIDS UR POC Negative     BUPRENORPHINE UR POC         Assessment and Plan  Jena Machado was seen today for breathing problem and low back pain. Diagnoses and all orders for this visit:    COPD with exacerbation ([address]  -     predniSONE (STERAPRED DS) 10 mg dose pack; See administration instruction per 10mg dose pack  -     amoxicillin-clavulanate (AUGMENTIN) 875-125 mg per tablet; Take 1 Tab by mouth two (2) times a day for 7 days. -     budesonide (PULMICORT) 180 mcg/actuation aepb inhaler; Take 2 Puffs by inhalation two (2) times a day. -     ALBUTEROL IPRATROP NON-COMP  -     NC INHAL RX, AIRWAY OBST/DX SPUTUM INDUCT  -     albuterol-ipratropium (DUO-NEB) 2.5 mg-0.5 mg/3 ml nebu; 3 mL by Nebulization route now for 1 dose. URI with cough and congestion  -     predniSONE (STERAPRED DS) 10 mg dose pack; See administration instruction per 10mg dose pack  -     amoxicillin-clavulanate (AUGMENTIN) 875-125 mg per tablet; Take 1 Tab by mouth two (2) times a day for 7 days. -     budesonide (PULMICORT) 180 mcg/actuation aepb inhaler; Take 2 Puffs by inhalation two (2) times a day. -     ALBUTEROL IPRATROP NON-COMP  -     NC INHAL RX, AIRWAY OBST/DX SPUTUM INDUCT  -     albuterol-ipratropium (DUO-NEB) 2.5 mg-0.5 mg/3 ml nebu; 3 mL by Nebulization route now for 1 dose. In-office Duo-neb treatment x 2, with both providing relief. Patient with mild wheezes in all lobes post second treatment. Patient reports feeling much better and ability to breath. Patient left office alert and oriented x3 in no distress. Discussed smoking cessation and mold triggers. Medications ordered - side effects, possible allergic reactions and warnings reviewed with patient. Patient verbalized understanding. After care summary printed and reviewed with patient. Plan reviewed with patient. Questions answered. Patient verbalized understanding of plan and is in agreement with plan.  Patient to follow up in one week or earlier if symptoms worsen.      Arnaud Brumfield, FNP-C

## 2017-06-14 NOTE — MR AVS SNAPSHOT
Visit Information Date & Time Provider Department Dept. Phone Encounter #  
 6/14/2017  1:15 PM Antonio Westfall NP [address] [phone] [id number] Follow-up Instructions Return in about 1 week (around 6/21/2017), or if symptoms worsen or fail to improve. Upcoming Health Maintenance Date Due Hepatitis C Screening 1960 DTaP/Tdap/Td series (1 - Tdap) 3/31/1983 FOBT Q 1 YEAR AGE 50-75 3/31/2012 INFLUENZA AGE 9 TO ADULT 8/1/2017 Allergies as of 6/14/2017  Review Complete On: 6/14/2017 By: Antonio Westfall NP No Known Allergies Current Immunizations  Never Reviewed Name Date Influenza Vaccine (Quad) PF 2/9/2017 Pneumococcal Polysaccharide (PPSV-23) 2/9/2017 Not reviewed this visit You Were Diagnosed With   
  
 Codes Comments COPD with exacerbation ([facility]    -  Primary ICD-10-CM: J44.1 ICD-9-CM: 491.21   
 URI with cough and congestion     ICD-10-CM: J06.9 ICD-9-CM: 465.9 Vitals BP Pulse Temp Height(growth percentile) Weight(growth percentile) SpO2  
 141/76 (BP 1 Location: Right arm, BP Patient Position: Sitting) 88 98 °F (36.7 °C) (Oral) 6' (1.829 m) 158 lb (71.7 kg) 93% BMI Smoking Status 21.43 kg/m2 Current Every Day Smoker BMI and BSA Data Body Mass Index Body Surface Area  
 21.43 kg/m 2 1.91 m 2 Preferred Pharmacy Pharmacy Name Phone Annalise Murray [address] [place] [address] Your Updated Medication List  
  
   
This list is accurate as of: 6/14/17  3:11 PM.  Always use your most recent med list.  
  
  
  
  
 * albuterol 2.5 mg /3 mL (0.083 %) nebulizer solution Commonly known as:  PROVENTIL VENTOLIN  
3 mL by Nebulization route every four (4) hours as needed for Wheezing. * VENTOLIN HFA 90 mcg/actuation inhaler Generic drug:  albuterol Take 2 Puffs by inhalation every four (4) hours as needed for Wheezing. albuterol-ipratropium 2.5 mg-0.5 mg/3 ml Nebu Commonly known as:  DUO-NEB  
3 mL by Nebulization route now for 1 dose. amLODIPine 5 mg tablet Commonly known as:  Corinne John Paul Take 1 Tab by mouth daily. amoxicillin-clavulanate 875-125 mg per tablet Commonly known as:  AUGMENTIN Take 1 Tab by mouth two (2) times a day for 7 days. budesonide 180 mcg/actuation Aepb inhaler Commonly known as:  PULMICORT Take 2 Puffs by inhalation two (2) times a day. diclofenac EC 75 mg EC tablet Commonly known as:  VOLTAREN  
75 mg two (2) times a day.  
  
 gabapentin 300 mg capsule Commonly known as:  NEURONTIN Take 300mg by mouth each morning; take 600mg by mouth each evening. OTHER Lumbar DDS Traction Belt  
  
 oxyCODONE-acetaminophen  mg per tablet Commonly known as:  PERCOCET 10 Take 1 Tab by mouth two (2) times daily as needed for Pain. Max Daily Amount: 2 Tabs. predniSONE 10 mg dose pack Commonly known as:  STERAPRED DS See administration instruction per 10mg dose pack * Notice: This list has 2 medication(s) that are the same as other medications prescribed for you. Read the directions carefully, and ask your doctor or other care provider to review them with you. Prescriptions Sent to Pharmacy Refills  
 predniSONE (STERAPRED DS) 10 mg dose pack 0 Sig: See administration instruction per 10mg dose pack Class: Normal  
 Pharmacy: [id number] Ph #: [phone]  
 amoxicillin-clavulanate (AUGMENTIN) 875-125 mg per tablet 0 Sig: Take 1 Tab by mouth two (2) times a day for 7 days. Class: Normal  
 Pharmacy: [address], [place] Ph #: [phone] Route: Oral  
 budesonide (PULMICORT) 180 mcg/actuation aepb inhaler 12 Sig: Take 2 Puffs by inhalation two (2) times a day. Class: Normal  
 Pharmacy: [address], [place]  #: [phone] Route: Inhalation We Performed the Following ALBUTEROL IPRATROP NON-COMP [id number] [facility]] [place] INHAL RX, AIRWAY OBST/DX SPUTUM INDUCT [id number] CPT(R)] Follow-up Instructions Return in about 1 week (around 6/21/2017), or if symptoms worsen or fail to improve. Patient Instructions COPD Exacerbation Plan: Care Instructions Your Care Instructions If you have chronic obstructive pulmonary disease (COPD), your usual shortness of breath could suddenly get worse. You may start coughing more and have more mucus. This flare-up is called a COPD exacerbation (say \"lp-HUO-eh-BAY-shun\"). A lung infection or air pollution could set off an exacerbation. Sometimes it can happen after a quick change in temperature or being around chemicals. Work with your doctor to make a plan for dealing with an exacerbation. You can better manage it if you plan ahead. Follow-up care is a key part of your treatment and safety. Be sure to make and go to all appointments, and call your doctor if you are having problems. It's also a good idea to know your test results and keep a list of the medicines you take. How can you care for yourself at home? During an exacerbation · Do not panic if you start to have one. Quick treatment at home may help you prevent serious breathing problems. If you have a COPD exacerbation plan that you developed with your doctor, follow it. · Take your medicines exactly as your doctor tells you. ¨ Use your inhaler as directed by your doctor. If your symptoms do not get better after you use your medicine, have someone take you to the emergency room. Call an ambulance if necessary. ¨ With inhaled medicines, a spacer or a nebulizer may help you get more medicine to your lungs.  Ask your doctor or pharmacist how to use them properly. Practice using the spacer in front of a mirror before you have an exacerbation. This may help you get the medicine into your lungs quickly. ¨ If your doctor has given you steroid pills, take them as directed. ¨ Your doctor may have given you a prescription for antibiotics, which you can fill if you need it. ¨ Talk to your doctor if you have any problems with your medicine. And call your doctor if you have to use your antibiotic or steroid pills. Preventing an exacerbation · Do not smoke. This is the most important step you can take to prevent more damage to your lungs and prevent problems. If you already smoke, it is never too late to stop. If you need help quitting, talk to your doctor about stop-smoking programs and medicines. These can increase your chances of quitting for good. · Take your daily medicines as prescribed. · Avoid colds and flu. ¨ Get a pneumococcal vaccine. ¨ Get a flu vaccine each year, as soon as it is available. Ask those you live or work with to do the same, so they will not get the flu and infect you. ¨ Try to stay away from people with colds or the flu. ¨ Wash your hands often. · Avoid secondhand smoke; air pollution; cold, dry air; hot, humid air; and high altitudes. Stay at home with your windows closed when air pollution is bad. · Learn breathing techniques for COPD, such as breathing through pursed lips. These techniques can help you breathe easier during an exacerbation. When should you call for help? Call 911 anytime you think you may need emergency care. For example, call if: 
· You have severe trouble breathing. · You have severe chest pain. Call your doctor now or seek immediate medical care if: 
· You have new or worse shortness of breath. · You develop new chest pain. · You are coughing more deeply or more often, especially if you notice more mucus or a change in the color of your mucus. · You cough up blood. · You have new or increased swelling in your legs or belly. · You have a fever. Watch closely for changes in your health, and be sure to contact your doctor if: 
· You need to use your antibiotic or steroid pills. · Your symptoms are getting worse. Where can you learn more? Go to http://izabella-onelia.info/. Enter W298 in the search box to learn more about \"COPD Exacerbation Plan: Care Instructions. \" Current as of: July 21, 2016 Content Version: 11.2 © 5017-3949 [employer]. Care instructions adapted under license by [employer] (which disclaims liability or warranty for this information). If you have questions about a medical condition or this instruction, always ask your healthcare professional. [address] any warranty or liability for your use of this information. Introducing [facility]! Sam Morataya introduces Poxel patient portal. Now you can access parts of your medical record, email your doctor's office, and request medication refills online. 1. In your internet browser, go to [email or web address] 2. Click on the First Time User? Click Here link in the Sign In box. You will see the New Member Sign Up page. 3. Enter your Poxel Access Code exactly as it appears below. You will not need to use this code after youve completed the sign-up process. If you do not sign up before the expiration date, you must request a new code. · Poxel Access Code: [email or web address] Expires: 9/12/2017  1:19 PM 
 
4. Enter the last four digits of your Social Security Number (xxxx) and Date of Birth (mm/dd/yyyy) as indicated and click Submit. You will be taken to the next sign-up page. 5. Create a Poxel ID. This will be your Poxel login ID and cannot be changed, so think of one that is secure and easy to remember. 6. Create a [email or web address] password. You can change your password at any time. 7. Enter your Password Reset Question and Answer. This can be used at a later time if you forget your password. 8. Enter your e-mail address. You will receive e-mail notification when new information is available in [address] 9. Click Sign Up. You can now view and download portions of your medical record. 10. Click the Download Summary menu link to download a portable copy of your medical information. If you have questions, please visit the Frequently Asked Questions section of the Gl[employer]ial website. Remember, Globial is NOT to be used for urgent needs. For medical emergencies, dial 911. Now available from your iPhone and Android! Please provide this summary of care documentation to your next provider. Your primary care clinician is listed as Liss Lombardo. If you have any questions after today's visit, please call [phone].

## 2017-06-14 NOTE — PATIENT INSTRUCTIONS
COPD Exacerbation Plan: Care Instructions  Your Care Instructions  If you have chronic obstructive pulmonary disease (COPD), your usual shortness of breath could suddenly get worse. You may start coughing more and have more mucus. This flare-up is called a COPD exacerbation (say \"hb-WTL-gu-BAY-phoebe\"). A lung infection or air pollution could set off an exacerbation. Sometimes it can happen after a quick change in temperature or being around chemicals. Work with your doctor to make a plan for dealing with an exacerbation. You can better manage it if you plan ahead. Follow-up care is a key part of your treatment and safety. Be sure to make and go to all appointments, and call your doctor if you are having problems. It's also a good idea to know your test results and keep a list of the medicines you take. How can you care for yourself at home? During an exacerbation  · Do not panic if you start to have one. Quick treatment at home may help you prevent serious breathing problems. If you have a COPD exacerbation plan that you developed with your doctor, follow it. · Take your medicines exactly as your doctor tells you. ¨ Use your inhaler as directed by your doctor. If your symptoms do not get better after you use your medicine, have someone take you to the emergency room. Call an ambulance if necessary. ¨ With inhaled medicines, a spacer or a nebulizer may help you get more medicine to your lungs. Ask your doctor or pharmacist how to use them properly. Practice using the spacer in front of a mirror before you have an exacerbation. This may help you get the medicine into your lungs quickly. ¨ If your doctor has given you steroid pills, take them as directed. ¨ Your doctor may have given you a prescription for antibiotics, which you can fill if you need it. ¨ Talk to your doctor if you have any problems with your medicine. And call your doctor if you have to use your antibiotic or steroid pills.   Preventing an exacerbation  · Do not smoke. This is the most important step you can take to prevent more damage to your lungs and prevent problems. If you already smoke, it is never too late to stop. If you need help quitting, talk to your doctor about stop-smoking programs and medicines. These can increase your chances of quitting for good. · Take your daily medicines as prescribed. · Avoid colds and flu. ¨ Get a pneumococcal vaccine. ¨ Get a flu vaccine each year, as soon as it is available. Ask those you live or work with to do the same, so they will not get the flu and infect you. ¨ Try to stay away from people with colds or the flu. ¨ Wash your hands often. · Avoid secondhand smoke; air pollution; cold, dry air; hot, humid air; and high altitudes. Stay at home with your windows closed when air pollution is bad. · Learn breathing techniques for COPD, such as breathing through pursed lips. These techniques can help you breathe easier during an exacerbation. When should you call for help? Call 911 anytime you think you may need emergency care. For example, call if:  · You have severe trouble breathing. · You have severe chest pain. Call your doctor now or seek immediate medical care if:  · You have new or worse shortness of breath. · You develop new chest pain. · You are coughing more deeply or more often, especially if you notice more mucus or a change in the color of your mucus. · You cough up blood. · You have new or increased swelling in your legs or belly. · You have a fever. Watch closely for changes in your health, and be sure to contact your doctor if:  · You need to use your antibiotic or steroid pills. · Your symptoms are getting worse. Where can you learn more? Go to http://izabella-onelia.info/. Enter C195 in the search box to learn more about \"COPD Exacerbation Plan: Care Instructions. \"  Current as of: July 21, 2016  Content Version: 11.2  © 2116-2212 [employer]wise[employer]. Care instructions adapted under license by [employer] (which disclaims liability or warranty for this information). If you have questions about a medical condition or this instruction, always ask your healthcare professional. [address] any warranty or liability for your use of this information.

## 2017-06-14 NOTE — PROGRESS NOTES
1. Have you been to the ER, urgent care clinic since your last visit? Hospitalized since your last visit? No    2. Have you seen or consulted any other health care providers outside of the [address] [place] [address] since your last visit? Include any pap smears or colon screening. No    Is someone accompanying this pt? no    Is the patient using any DME equipment during OV? no      Chief Complaint   Patient presents with    Breathing Problem     Pt states that he has been having shortness of breath for 2 weeks. Pt states that he does have a HX of COPD. Pt is unsure if it is due to weather change.  LOW BACK PAIN     Pt states that this is chronic issue.

## 2017-09-20 ENCOUNTER — TELEPHONE (OUTPATIENT)
Dept: FAMILY MEDICINE CLINIC | Age: 57
End: 2017-09-20

## 2017-09-21 NOTE — TELEPHONE ENCOUNTER
LVM for the patient to call and schedule an appointment. Per DR. Epstein the patient must be seen for further refills.

## 2017-09-22 ENCOUNTER — OFFICE VISIT (OUTPATIENT)
Dept: FAMILY MEDICINE CLINIC | Age: 57
End: 2017-09-22

## 2017-09-22 VITALS
HEIGHT: 72 IN | SYSTOLIC BLOOD PRESSURE: 136 MMHG | OXYGEN SATURATION: 96 % | WEIGHT: 161 LBS | DIASTOLIC BLOOD PRESSURE: 75 MMHG | TEMPERATURE: 97.4 F | RESPIRATION RATE: 17 BRPM | BODY MASS INDEX: 21.81 KG/M2 | HEART RATE: 78 BPM

## 2017-09-22 DIAGNOSIS — J44.1 COPD WITH EXACERBATION (HCC): ICD-10-CM

## 2017-09-22 DIAGNOSIS — G89.29 CHRONIC MIDLINE LOW BACK PAIN WITH SCIATICA, SCIATICA LATERALITY UNSPECIFIED: ICD-10-CM

## 2017-09-22 DIAGNOSIS — M54.40 CHRONIC MIDLINE LOW BACK PAIN WITH SCIATICA, SCIATICA LATERALITY UNSPECIFIED: ICD-10-CM

## 2017-09-22 DIAGNOSIS — I10 ESSENTIAL HYPERTENSION: ICD-10-CM

## 2017-09-22 RX ORDER — AMLODIPINE BESYLATE 5 MG/1
5 TABLET ORAL DAILY
Qty: 30 TAB | Refills: 0 | Status: SHIPPED | OUTPATIENT
Start: 2017-09-22 | End: 2017-11-20 | Stop reason: SDUPTHER

## 2017-09-22 RX ORDER — ALBUTEROL SULFATE 90 UG/1
2 AEROSOL, METERED RESPIRATORY (INHALATION)
Qty: 1 INHALER | Refills: 5 | Status: SHIPPED | OUTPATIENT
Start: 2017-09-22

## 2017-09-22 RX ORDER — GABAPENTIN 300 MG/1
CAPSULE ORAL
Qty: 90 CAP | Refills: 5 | Status: SHIPPED | OUTPATIENT
Start: 2017-09-22

## 2017-09-22 NOTE — PROGRESS NOTES
Chief Complaint   Patient presents with    Medication Refill    COPD    Hypertension     managed with meds     1. Have you been to the ER, urgent care clinic since your last visit? Hospitalized since your last visit? No    2. Have you seen or consulted any other health care providers outside of the [address] [place] [address] since your last visit? Include any pap smears or colon screening.  No

## 2017-09-22 NOTE — PATIENT INSTRUCTIONS
Please contact our office if you have any questions about your visit today. Influenza (Flu) Vaccine (Live, Intranasal): What You Need to Know  Why get vaccinated? Influenza (\"flu\") is a contagious disease that spreads around the [place] every winter, usually between October and May. Flu is caused by influenza viruses, and is spread mainly by coughing, sneezing, and close contact. Anyone can get the flu. Flu strikes suddenly and can last several days. Symptoms vary by age, but can include:  · Fever/chills. · Sore throat. · Muscle aches. · Fatigue. · Cough. · Headache. · Runny or stuffy nose. Flu can also lead to pneumonia and blood infections, and cause diarrhea and seizures in children. If you have a medical condition, such as heart or lung disease, flu can make it worse. Flu can also lead to pneumonia and blood infections, and cause diarrhea and seizures in children. If you have a medical condition, such as heart or lung disease, flu can make it worse. Each year thousands of people in the [facility] die from flu, and many more are hospitalized. Flu vaccine can:   · Keep you from getting flu. · Make flu less severe if you do get it, and  · Keep you from spreading flu to your family and other people. Live, attenuated flu vaccine--LAIV, nasal spray  A dose of flu vaccine is recommended every flu season. Children younger than 5years of age may need two doses during the same flu season. Everyone else needs only one dose each flu season. The live, attenuated influenza vaccine (called LAIV) may be given to healthy, non-pregnant people 2 through 52years of age. It may safely be given at the same time as other vaccines. LAIV is sprayed into the nose. LAIV does not contain thimerosal or other preservatives. It is made from weakened flu virus and does not cause flu. There are many flu viruses, and they are always changing.  Each year LAIV is made to protect against four viruses that are likely to cause disease in the upcoming flu season. But even when the vaccine doesn't exactly match these viruses, it may still provide some protection. Flu vaccine cannot prevent:  · Flu that is caused by a virus not covered by the vaccine, or  · Illnesses that look like flu but are not. It takes about 2 weeks for protection to develop after vaccination, and protection lasts through the flu season. Some people should not get this vaccine  Some people should not get LAIV because of age, health conditions, or other reasons. Most of these people should get an injected flu vaccine instead. Your healthcare provider can help you decide. Tell the provider if you or the person being vaccinated:  · Have any allergies, including an allergy to eggs, or have ever had an allergic reaction to an influenza vaccine. · Have ever had Guillain-Barré Syndrome (also called GBS). · Have any long-term heart, breathing, kidney, liver, or nervous system problems. · Have asthma or breathing problems, or are a child who has had wheezing episodes. · Are pregnant. · Are a child or adolescent who is receiving aspirin or aspirin-containing products. · Have a weakened immune system. · Will be visiting or taking care of someone, within the next 7 days, who requires a protected environment (for example, following a bone marrow transplant)  Sometimes LAIV should be delayed. Tell the provider if you or the person being vaccinated:  · Are not feeling well. The vaccine could be delayed until you feel better. · Have gotten any other vaccines in the past 4 weeks. Live vaccines given too close together might not work as well. · Have taken influenza antiviral medication in the past 48 hours. · Have a very stuffy nose. Risks of a vaccine reaction  With any medicine, including vaccines, there is a chance of reactions. These are usually mild and go away on their own, but serious reactions are also possible.   Most people who get LAIV do not have any problems with it. Reactions to LAIV may resemble a very mild case of flu. Problems that have been reported following LAIV:  Children and adolescents 317 years of age:   · Runny nose/nasal congestion  · Cough  · Fever  · Headache and muscle aches  · Wheezing abdominal pain, vomiting, or diarrhea  Adults 2549 years of age:   · Runny nose/nasal congestion  · Sore throat  · Cough  · Chills  · Tiredness/weakness  · Headache  Problems that could happen after any vaccine:  · Any medication can cause a severe allergic reaction. Such reactions from a vaccine are very rare, estimated at about 1 in a million doses, and would happen within a few minutes to a few hours after the vaccination. As with any medicine, there is a very remote chance of a vaccine causing a serious injury or death. The safety of vaccines is always being monitored. For more information, visit: www.cdc.gov/vaccinesafety/  What if there is a serious reaction? What should I look for? · Look for anything that concerns you, such as signs of a severe allergic reaction, very high fever, or unusual behavior. Signs of a severe allergic reaction can include hives, swelling of the face and throat, difficulty breathing, a fast heartbeat, dizziness, and weakness. These would start a few minutes to a few hours after the vaccination. What should I do? · If you think it is a severe allergic reaction or other emergency that can't wait, call 9-1-1 or get the person to the nearest hospital. Otherwise, call your doctor. · Reactions should be reported to the \"Vaccine Adverse Event Reporting System\" (VAERS). Your doctor should file this report, or you can do it yourself through the VAERS web site at www.vaers. hhs.gov, or by calling 2-317.817.5191. VAERS does not give medical advice.   The [facility] Vaccine Injury Compensation Program  The National Vaccine Injury Compensation Program (VICP) is a federal program that was created to compensate people who may have been injured by certain vaccines. Persons who believe they may have been injured by a vaccine can learn about the program and about filing a claim by calling 0-440.538.9208 or visiting the [employer] website at www.[facility].gov/vaccinecompensation. There is a time limit to file a claim for compensation. How can I learn more? · Ask your doctor. He or she can give you the vaccine package insert or suggest other sources of information. · Call your local or state health department. · Contact the Centers for Disease Control and Prevention (CDC):  ¨ Call 3-590.445.3111 (1-800-CDC-INFO) or  ¨ Visit CDC's website at www.cdc.gov/flu  Vaccine Information Statement  Live Attenuated Influenza Vaccine  8/7/2015  [address] 151LZ-27  Department of Health and Human Services  Centers for Disease Control and Prevention  Many Vaccine Information Statements are available in Turkmen and other languages. See www.immunize.org/vis. Muchas hojas de información sobre vacunas están disponibles en español y en otros idiomas. Visite www.immunize.org/vis.   Content Version: 48.8.658332

## 2017-09-22 NOTE — MR AVS SNAPSHOT
Visit Information Date & Time Provider Department Dept. Phone Encounter #  
 9/22/2017 11:45 AM Ariadne Glover NP [address] [id number] Follow-up Instructions Return in about 3 months (around 12/22/2017), or if symptoms worsen or fail to improve. Upcoming Health Maintenance Date Due Hepatitis C Screening 1960 DTaP/Tdap/Td series (1 - Tdap) 3/31/1983 FOBT Q 1 YEAR AGE 50-75 3/31/2012 INFLUENZA AGE 9 TO ADULT 8/1/2017 Allergies as of 9/22/2017  Review Complete On: 4/08/8547 By: Gayle Ortiz. Jing Ramirez LPN No Known Allergies Current Immunizations  Never Reviewed Name Date Influenza Vaccine (Quad) PF 2/9/2017 Pneumococcal Polysaccharide (PPSV-23) 2/9/2017 Not reviewed this visit You Were Diagnosed With   
  
 Codes Comments Essential hypertension     ICD-10-CM: I10 
ICD-9-CM: 401.9 [facility] exacerbation ([facility]     ICD-10-CM: J44.1 ICD-9-CM: 491.21 Chronic midline low back pain with sciatica, sciatica laterality unspecified     ICD-10-CM: M54.40, G89.29 ICD-9-CM: 724.2, 724.3, 338.29 Vitals BP Pulse Temp Resp Height(growth percentile) Weight(growth percentile) 136/75 (BP 1 Location: Right arm, BP Patient Position: Sitting) 78 97.4 °F (36.3 °C) (Oral) 17 6' (1.829 m) 161 lb (73 kg) SpO2 BMI Smoking Status 96% 21.84 kg/m2 Current Every Day Smoker BMI and BSA Data Body Mass Index Body Surface Area  
 21.84 kg/m 2 1.93 m 2 Preferred Pharmacy Pharmacy Name Phone [facility] [place] [id number] [facility] Your Updated Medication List  
  
   
This list is accurate as of: 9/22/17 12:35 PM.  Always use your most recent med list.  
  
  
  
  
 * albuterol 2.5 mg /3 mL (0.083 %) nebulizer solution Commonly known as:  PROVENTIL VENTOLIN  
3 mL by Nebulization route every four (4) hours as needed for Wheezing. * VENTOLIN HFA 90 mcg/actuation inhaler Generic drug:  albuterol Take 2 Puffs by inhalation every four (4) hours as needed for Wheezing. amLODIPine 5 mg tablet Commonly known as:  Gabriel Rings Take 1 Tab by mouth daily. Appointment required before further refills will be authorized. budesonide 180 mcg/actuation Aepb inhaler Commonly known as:  PULMICORT Take 2 Puffs by inhalation two (2) times a day. diclofenac EC 75 mg EC tablet Commonly known as:  VOLTAREN  
75 mg two (2) times a day.  
  
 gabapentin 300 mg capsule Commonly known as:  NEURONTIN Take 300mg by mouth each morning; take 600mg by mouth each evening. OTHER Lumbar DDS Traction Belt  
  
 oxyCODONE-acetaminophen  mg per tablet Commonly known as:  PERCOCET 10 Take 1 Tab by mouth two (2) times daily as needed for Pain. Max Daily Amount: 2 Tabs. * Notice: This list has 2 medication(s) that are the same as other medications prescribed for you. Read the directions carefully, and ask your doctor or other care provider to review them with you. Prescriptions Sent to Pharmacy Refills  
 amLODIPine (NORVASC) 5 mg tablet 0 Sig: Take 1 Tab by mouth daily. Appointment required before further refills will be authorized. Class: Normal  
 Pharmacy: [address], [place] Ph #: [phone] Route: Oral  
 VENTOLIN HFA 90 mcg/actuation inhaler 5 Sig: Take 2 Puffs by inhalation every four (4) hours as needed for Wheezing. Class: Normal  
 Pharmacy: [facility] Ph #: [phone] Route: Inhalation  
 gabapentin (NEURONTIN) 300 mg capsule 5 Sig: Take 300mg by mouth each morning; take 600mg by mouth each evening. Class: Normal  
 [facility] Ph #: [phone] Follow-up Instructions Return in about 3 months (around 12/22/2017), or if symptoms worsen or fail to improve. Patient Instructions Please contact our office if you have any questions about your visit today. Influenza (Flu) Vaccine (Live, Intranasal): What You Need to Know Why get vaccinated? Influenza (\"flu\") is a contagious disease that spreads around the [place] every winter, usually between October and May. Flu is caused by influenza viruses, and is spread mainly by coughing, sneezing, and close contact. Anyone can get the flu. Flu strikes suddenly and can last several days. Symptoms vary by age, but can include: · Fever/chills. · Sore throat. · Muscle aches. · Fatigue. · Cough. · Headache. · Runny or stuffy nose. Flu can also lead to pneumonia and blood infections, and cause diarrhea and seizures in children. If you have a medical condition, such as heart or lung disease, flu can make it worse. Flu can also lead to pneumonia and blood infections, and cause diarrhea and seizures in children. If you have a medical condition, such as heart or lung disease, flu can make it worse. Each year thousands of people in the [facility] die from flu, and many more are hospitalized. Flu vaccine can: · Keep you from getting flu. · Make flu less severe if you do get it, and 
· Keep you from spreading flu to your family and other people. Live, attenuated flu vaccineLAIV, nasal spray A dose of flu vaccine is recommended every flu season. Children younger than 5years of age may need two doses during the same flu season. Everyone else needs only one dose each flu season. The live, attenuated influenza vaccine (called LAIV) may be given to healthy, non-pregnant people 2 through 52years of age. It may safely be given at the same time as other vaccines. LAIV is sprayed into the nose. LAIV does not contain thimerosal or other preservatives. It is made from weakened flu virus and does not cause flu. There are many flu viruses, and they are always changing. Each year LAIV is made to protect against four viruses that are likely to cause disease in the upcoming flu season. But even when the vaccine doesn't exactly match these viruses, it may still provide some protection. Flu vaccine cannot prevent: · Flu that is caused by a virus not covered by the vaccine, or 
· Illnesses that look like flu but are not. It takes about 2 weeks for protection to develop after vaccination, and protection lasts through the flu season. Some people should not get this vaccine Some people should not get LAIV because of age, health conditions, or other reasons. Most of these people should get an injected flu vaccine instead. Your healthcare provider can help you decide. Tell the provider if you or the person being vaccinated: 
· Have any allergies, including an allergy to eggs, or have ever had an allergic reaction to an influenza vaccine. · Have ever had Guillain-Barré Syndrome (also called GBS). · Have any long-term heart, breathing, kidney, liver, or nervous system problems. · Have asthma or breathing problems, or are a child who has had wheezing episodes. · Are pregnant. · Are a child or adolescent who is receiving aspirin or aspirin-containing products. · Have a weakened immune system. · Will be visiting or taking care of someone, within the next 7 days, who requires a protected environment (for example, following a bone marrow transplant) Sometimes LAIV should be delayed. Tell the provider if you or the person being vaccinated: · Are not feeling well. The vaccine could be delayed until you feel better. · Have gotten any other vaccines in the past 4 weeks. Live vaccines given too close together might not work as well. · Have taken influenza antiviral medication in the past 48 hours. · Have a very stuffy nose. Risks of a vaccine reaction With any medicine, including vaccines, there is a chance of reactions. These are usually mild and go away on their own, but serious reactions are also possible. Most people who get LAIV do not have any problems with it. Reactions to LAIV may resemble a very mild case of flu. Problems that have been reported following LAIV: 
Children and adolescents 317 years of age: · Runny nose/nasal congestion · Cough · Fever · Headache and muscle aches · Wheezing abdominal pain, vomiting, or diarrhea Adults 2549 years of age: · Runny nose/nasal congestion · Sore throat · Cough · Chills · Tiredness/weakness · Headache Problems that could happen after any vaccine: · Any medication can cause a severe allergic reaction. Such reactions from a vaccine are very rare, estimated at about 1 in a million doses, and would happen within a few minutes to a few hours after the vaccination. As with any medicine, there is a very remote chance of a vaccine causing a serious injury or death. The safety of vaccines is always being monitored. For more information, visit: www.cdc.gov/vaccinesafety/ What if there is a serious reaction? What should I look for? · Look for anything that concerns you, such as signs of a severe allergic reaction, very high fever, or unusual behavior. Signs of a severe allergic reaction can include hives, swelling of the face and throat, difficulty breathing, a fast heartbeat, dizziness, and weakness. These would start a few minutes to a few hours after the vaccination. What should I do? · If you think it is a severe allergic reaction or other emergency that can't wait, call 9-1-1 or get the person to the nearest hospital. Otherwise, call your doctor. · Reactions should be reported to the \"Vaccine Adverse Event Reporting System\" (VAERS). Your doctor should file this report, or you can do it yourself through the VAERS web site at www.vaers. [facility].gov, or by calling 1-787.155.2375. [employer] does not give medical advice.  
The [facility] Vaccine Injury W. RBreonna AguileraCanal [place] 
 The [employer] Injury Compensation Program (VICP) is a federal program that was created to compensate people who may have been injured by certain vaccines. Persons who believe they may have been injured by a vaccine can learn about the program and about filing a claim by calling 5-860.856.3865 or visiting the [employer] website at www.[facility].gov/vaccinecompensation. There is a time limit to file a claim for compensation. How can I learn more? · Ask your doctor. He or she can give you the vaccine package insert or suggest other sources of information. · Call your local or state health department. · Contact the Centers for Disease Control and Prevention (CDC): 
¨ Call 3-969.795.3031 (1-800-CDC-INFO) or ¨ Visit CDC's website at www.cdc.gov/flu Vaccine Information Statement Live Attenuated Influenza Vaccine 8/7/2015 
42  [facility] [address]13 Department of Health and [employer]) Centers for Disease Control and Prevention Many Vaccine Information Statements are available in Malay and other languages. See www.immunize.org/vis. Muchas hojas de información sobre vacunas están disponibles en español y en otros idiomas. Visite www.immunize.org/vis. Content Version: 43.5.557534 Introducing [address]! Alexandro Brooks introduces [employer] patient portal. Now you can access parts of your medical record, email your doctor's office, and request medication refills online. 1. In your internet browser, go to [email or web address] 2. Click on the First Time User? Click Here link in the Sign In box. You will see the New Member Sign Up page. 3. Enter your [employer] Access Code exactly as it appears below. You will not need to use this code after youve completed the sign-up process. If you do not sign up before the expiration date, you must request a new code. · [employer] Access Code: [facility], [facility] Expires: 12/21/2017 11:54 AM 
 
 4. Enter the last four digits of your Social Security Number (xxxx) and Date of Birth (mm/dd/yyyy) as indicated and click Submit. You will be taken to the next sign-up page. 5. Create a [employer] ID. This will be your [employer] login ID and cannot be changed, so think of one that is secure and easy to remember. 6. Create a [employer] password. You can change your password at any time. 7. Enter your Password Reset Question and Answer. This can be used at a later time if you forget your password. 8. Enter your e-mail address. You will receive e-mail notification when new information is available in [address] 9. Click Sign Up. You can now view and download portions of your medical record. 10. Click the Download Summary menu link to download a portable copy of your medical information. If you have questions, please visit the Frequently Asked Questions section of the [employer] website. Remember, [employer] is NOT to be used for urgent needs. For medical emergencies, dial 911. Now available from your iPhone and Android! Please provide this summary of care documentation to your next provider. Your primary care clinician is listed as Kurtis Deluca. If you have any questions after today's visit, please call [phone].

## 2017-09-22 NOTE — PROGRESS NOTES
PRABHJOT Torres is a 56 y.o. male  Chief Complaint   Patient presents with    Medication Refill    COPD    Hypertension     managed with meds     Here for medication refills. Denies chest pain. Denies shortness of breath. Admits to being out of blood pressure medications for the last four days. Denies dizziness or blurred vision. COPD - admits to continual smoking. Denies cough, wheezing, shortness of breath, or sore throat. Reports he only uses his albuterol as needed. Denies recent use. Requesting a refill. Back pain - reports Neurontin works. Denies pain currently. Requesting refill. Past Medical History  Past Medical History:   Diagnosis Date    COPD (chronic obstructive pulmonary disease) ([facility] 2015    Essential hypertension 5/13    new    Hyperlipidemia        Surgical History  No past surgical history on file. Medications  Current Outpatient Prescriptions   Medication Sig Dispense Refill    amLODIPine (NORVASC) 5 mg tablet Take 1 Tab by mouth daily. Appointment required before further refills will be authorized. 30 Tab 0    VENTOLIN HFA 90 mcg/actuation inhaler Take 2 Puffs by inhalation every four (4) hours as needed for Wheezing. 1 Inhaler 5    gabapentin (NEURONTIN) 300 mg capsule Take 300mg by mouth each morning; take 600mg by mouth each evening. 90 Cap 5    diclofenac EC (VOLTAREN) 75 mg EC tablet 75 mg two (2) times a day.  budesonide (PULMICORT) 180 mcg/actuation aepb inhaler Take 2 Puffs by inhalation two (2) times a day. 1 Inhaler 12    albuterol (PROVENTIL VENTOLIN) 2.5 mg /3 mL (0.083 %) nebulizer solution 3 mL by Nebulization route every four (4) hours as needed for Wheezing. 24 Each 12    OTHER Lumbar DDS Traction Belt 1 Units prn    oxyCODONE-acetaminophen (PERCOCET 10)  mg per tablet Take 1 Tab by mouth two (2) times daily as needed for Pain. Max Daily Amount: 2 Tabs.  30 Tab 0       Allergies  No Known Allergies    Family History  Family History Problem Relation Age of Onset    Heart Attack Neg Hx     Sudden Death Neg Hx        Social History  Social History     Social History    Marital status:      Spouse name: N/A    Number of children: N/A    Years of education: N/A     Occupational History    Not on file. Social History Main Topics    Smoking status: Current Every Day Smoker     Packs/day: 1.00    Smokeless tobacco: Not on file    Alcohol use 7.2 oz/week     12 Cans of beer per week      Comment: 2-3 drinks vodka/day- half pint    Drug use: Yes     Special: Marijuana      Comment: rarely    Sexual activity: Yes     Partners: Female     Other Topics Concern    Not on file     Social History Narrative       Problem List  Patient Active Problem List   Diagnosis Code    Chronic midline low back pain with sciatica M54.40, G89.29    [facility] J44.9    Essential hypertension I10    Tobacco use Z72.0    Encounter for long-term (current) use of medications Z79.899    Chronic midline low back pain with bilateral sciatica M54.41, M54.42, G89.29    Osteoarthritis of spine with radiculopathy, lumbosacral region M47.27    Pulmonary emphysema (HCC) J43.9    Polyarthralgia M25.50    Generalized OA M15.9    Neuropathy ([facility] G62.9    Neuropathic pain M79.2    Chronic pain syndrome G89.4    Intermittent claudication (HCC) I73.9       Review of Systems  Review of Systems   Constitutional: Negative for chills and fever. Respiratory: Negative for shortness of breath and wheezing. Cardiovascular: Negative for chest pain and palpitations. Gastrointestinal: Negative for abdominal pain, nausea and vomiting. Neurological: Negative for dizziness and headaches.        Vital Signs  Vitals:    09/22/17 1213   BP: 136/75   Pulse: 78   Resp: 17   Temp: 97.4 °F (36.3 °C)   TempSrc: Oral   SpO2: 96%   Weight: 161 lb (73 kg)   Height: 6' (1.829 m)   PainSc:   0 - No pain       Physical Exam  Physical Exam Constitutional: He is oriented to person, place, and time. HENT:   Right Ear: External ear normal.   Left Ear: External ear normal.   Mouth/Throat: Oropharynx is clear and moist.   Neck: Normal range of motion. Cardiovascular: Normal rate, regular rhythm and normal heart sounds. Pulmonary/Chest: Effort normal. No respiratory distress. He has wheezes (faint expiratory wheezes right lower field) in the right lower field. He has no rhonchi. He has no rales. Neurological: He is alert and oriented to person, place, and time. Psychiatric: He has a normal mood and affect. His behavior is normal.   Vitals reviewed. Diagnostics  Orders Placed This Encounter    amLODIPine (NORVASC) 5 mg tablet     Sig: Take 1 Tab by mouth daily. Appointment required before further refills will be authorized. Dispense:  30 Tab     Refill:  0    VENTOLIN HFA 90 mcg/actuation inhaler     Sig: Take 2 Puffs by inhalation every four (4) hours as needed for Wheezing. Dispense:  1 Inhaler     Refill:  5    gabapentin (NEURONTIN) 300 mg capsule     Sig: Take 300mg by mouth each morning; take 600mg by mouth each evening. Dispense:  90 Cap     Refill:  5       Results  Results for orders placed or performed in visit on 03/16/17   AMB POC DRUG SCREEN ()   Result Value Ref Range    ALCOHOL UR POC      AMPHETAMINES UR POC Negative     CARISOPRODOL UR POC      COCAINE UR POC Negative     FENTANYL UR POC      MDMA/ECSTASY UR POC Negative     METHADONE UR POC Negative     METHAMPHETAMINE UR POC Negative     METHYLPHENIDATE UR POC Negative     OPIATES UR POC Negative     OXYCODONE UR POC Presumptive Positive     PHENCYCLIDINE UR POC Negative     PROPOXYPHENE UR POC      TRAMADOL UR POC      TRICYCLICS UR POC Negative     BARBITURATES UR POC Negative     BENZODIAZEPINES UR POC Negative     CANNABINOIDS UR POC Negative     BUPRENORPHINE UR POC             Assessment and Plan  Diagnoses and all orders for this visit:    1. Essential hypertension  -     amLODIPine (NORVASC) 5 mg tablet; Take 1 Tab by mouth daily. Appointment required before further refills will be authorized. 2. COPD with exacerbation (HCC)  -     VENTOLIN HFA 90 mcg/actuation inhaler; Take 2 Puffs by inhalation every four (4) hours as needed for Wheezing. 3. Chronic midline low back pain with sciatica, sciatica laterality unspecified  -     gabapentin (NEURONTIN) 300 mg capsule; Take 300mg by mouth each morning; take 600mg by mouth each evening. Flu vaccine offered. Patient declined influenza due to time constraint. Reports he will return for it. Educated on the risk of not getting the vaccine. Patient verbalized understanding. After care summary printed and reviewed with patient. Plan reviewed with patient. Questions answered. Patient verbalized understanding of plan and is in agreement with plan. Patient to follow up in three months with PCP or earlier if symptoms worsen.    Mague Easton, FNP-C

## 2017-11-20 ENCOUNTER — HOSPITAL ENCOUNTER (OUTPATIENT)
Dept: LAB | Age: 57
Discharge: HOME | End: 2017-11-20
Payer: COMMERCIAL

## 2017-11-20 ENCOUNTER — OFFICE VISIT (OUTPATIENT)
Dept: FAMILY MEDICINE CLINIC | Age: 57
End: 2017-11-20

## 2017-11-20 VITALS
BODY MASS INDEX: 22.35 KG/M2 | HEART RATE: 82 BPM | SYSTOLIC BLOOD PRESSURE: 139 MMHG | WEIGHT: 165 LBS | HEIGHT: 72 IN | RESPIRATION RATE: 18 BRPM | TEMPERATURE: 97.2 F | OXYGEN SATURATION: 96 % | DIASTOLIC BLOOD PRESSURE: 68 MMHG

## 2017-11-20 DIAGNOSIS — Z12.5 SCREENING FOR PROSTATE CANCER: ICD-10-CM

## 2017-11-20 DIAGNOSIS — Z23 ENCOUNTER FOR IMMUNIZATION: ICD-10-CM

## 2017-11-20 DIAGNOSIS — R11.0 NAUSEA: ICD-10-CM

## 2017-11-20 DIAGNOSIS — I10 ESSENTIAL HYPERTENSION: Primary | ICD-10-CM

## 2017-11-20 DIAGNOSIS — I10 ESSENTIAL HYPERTENSION: ICD-10-CM

## 2017-11-20 DIAGNOSIS — E78.5 HYPERLIPIDEMIA, UNSPECIFIED HYPERLIPIDEMIA TYPE: ICD-10-CM

## 2017-11-20 LAB
ALBUMIN SERPL-MCNC: 4.2 G/DL (ref 3.4–5)
ALBUMIN/GLOB SERPL: 1.4 {RATIO} (ref 0.8–1.7)
ALP SERPL-CCNC: 70 U/L (ref 45–117)
ALT SERPL-CCNC: 22 U/L (ref 16–61)
ANION GAP SERPL CALC-SCNC: 7 MMOL/L (ref 3–18)
AST SERPL-CCNC: 13 U/L (ref 15–37)
BASOPHILS # BLD: 0 K/UL (ref 0–0.06)
BASOPHILS NFR BLD: 1 % (ref 0–2)
BILIRUB SERPL-MCNC: 0.3 MG/DL (ref 0.2–1)
BUN SERPL-MCNC: 14 MG/DL (ref 7–18)
BUN/CREAT SERPL: 22 (ref 12–20)
CALCIUM SERPL-MCNC: 9.3 MG/DL (ref 8.5–10.1)
CHLORIDE SERPL-SCNC: 101 MMOL/L (ref 100–108)
CHOLEST SERPL-MCNC: 236 MG/DL
CO2 SERPL-SCNC: 30 MMOL/L (ref 21–32)
CREAT SERPL-MCNC: 0.65 MG/DL (ref 0.6–1.3)
DIFFERENTIAL METHOD BLD: ABNORMAL
EOSINOPHIL # BLD: 0.3 K/UL (ref 0–0.4)
EOSINOPHIL NFR BLD: 6 % (ref 0–5)
ERYTHROCYTE [DISTWIDTH] IN BLOOD BY AUTOMATED COUNT: 14.3 % (ref 11.6–14.5)
GLOBULIN SER CALC-MCNC: 3.1 G/DL (ref 2–4)
GLUCOSE SERPL-MCNC: 86 MG/DL (ref 74–99)
HCT VFR BLD AUTO: 46.2 % (ref 36–48)
HDLC SERPL-MCNC: 68 MG/DL (ref 40–60)
HDLC SERPL: 3.5 {RATIO} (ref 0–5)
HGB BLD-MCNC: 14.9 G/DL (ref 13–16)
LDLC SERPL CALC-MCNC: 136.4 MG/DL (ref 0–100)
LIPID PROFILE,FLP: ABNORMAL
LYMPHOCYTES # BLD: 1.8 K/UL (ref 0.9–3.6)
LYMPHOCYTES NFR BLD: 32 % (ref 21–52)
MCH RBC QN AUTO: 31.4 PG (ref 24–34)
MCHC RBC AUTO-ENTMCNC: 32.3 G/DL (ref 31–37)
MCV RBC AUTO: 97.3 FL (ref 74–97)
MONOCYTES # BLD: 0.5 K/UL (ref 0.05–1.2)
MONOCYTES NFR BLD: 9 % (ref 3–10)
NEUTS SEG # BLD: 3 K/UL (ref 1.8–8)
NEUTS SEG NFR BLD: 52 % (ref 40–73)
PLATELET # BLD AUTO: 288 K/UL (ref 135–420)
PMV BLD AUTO: 10.5 FL (ref 9.2–11.8)
POTASSIUM SERPL-SCNC: 3.9 MMOL/L (ref 3.5–5.5)
PROT SERPL-MCNC: 7.3 G/DL (ref 6.4–8.2)
PSA SERPL-MCNC: 0.4 NG/ML (ref 0–4)
RBC # BLD AUTO: 4.75 M/UL (ref 4.7–5.5)
SODIUM SERPL-SCNC: 138 MMOL/L (ref 136–145)
TRIGL SERPL-MCNC: 158 MG/DL (ref ?–150)
TSH SERPL DL<=0.05 MIU/L-ACNC: 1.25 UIU/ML (ref 0.36–3.74)
VLDLC SERPL CALC-MCNC: 31.6 MG/DL
WBC # BLD AUTO: 5.7 K/UL (ref 4.6–13.2)

## 2017-11-20 PROCEDURE — 80053 COMPREHEN METABOLIC PANEL: CPT | Performed by: FAMILY MEDICINE

## 2017-11-20 PROCEDURE — 36415 COLL VENOUS BLD VENIPUNCTURE: CPT | Performed by: FAMILY MEDICINE

## 2017-11-20 PROCEDURE — 80061 LIPID PANEL: CPT | Performed by: FAMILY MEDICINE

## 2017-11-20 PROCEDURE — 84443 ASSAY THYROID STIM HORMONE: CPT | Performed by: FAMILY MEDICINE

## 2017-11-20 PROCEDURE — 85025 COMPLETE CBC W/AUTO DIFF WBC: CPT | Performed by: FAMILY MEDICINE

## 2017-11-20 PROCEDURE — 84153 ASSAY OF PSA TOTAL: CPT | Performed by: FAMILY MEDICINE

## 2017-11-20 RX ORDER — ONDANSETRON 4 MG/1
4 TABLET, ORALLY DISINTEGRATING ORAL
Qty: 30 TAB | Refills: 0 | Status: SHIPPED | OUTPATIENT
Start: 2017-11-20

## 2017-11-20 RX ORDER — AMLODIPINE BESYLATE 5 MG/1
5 TABLET ORAL DAILY
Qty: 30 TAB | Refills: 5 | Status: SHIPPED | OUTPATIENT
Start: 2017-11-20

## 2017-11-20 NOTE — PROGRESS NOTES
Chief Complaint   Patient presents with    Follow-up    COPD    Hypertension    Medication Refill       HISTORY OF PRESENT ILLNESS  Charli Yeung is a 56 y.o. male. HPI  Pt here for f/u of htn and copd. His breathing has been good. Pt is seen at pain management and his pain is well controlled. He is still able to work. No recent labs. Pt c/o nausea x 3 wks. It was initially intermittent, then progressed to all day nausea. He has an appetite and has been able to eat. If he drinks a lot of fluids, he will vomit that. He has been having daily vomiting for the past week. Sometimes it occurs twice in a day; usually it is later in the day. He has not tried any otc meds. Pt does not have any stomach pain. He is stooling daily and reports no change in his stools. No fevers or chills. Review of Systems   Constitutional: Negative. Negative for chills and fever. HENT: Negative. Respiratory: Negative. Cardiovascular: Negative. Gastrointestinal: Positive for nausea and vomiting. Negative for abdominal pain, blood in stool, constipation, diarrhea and heartburn. All other systems reviewed and are negative. Physical Exam  Nursing note and vitals reviewed. Constitutional: He is oriented to person, place, and time. He appears well-developed and well-nourished. HENT:   Head: Normocephalic and atraumatic. Right Ear: External ear normal.   Left Ear: External ear normal.   Nose: Nose normal.   Eyes: Conjunctivae and EOM are normal.   Neck: Normal range of motion. Neck supple. No JVD present. Carotid bruit is not present. No thyromegaly present. Cardiovascular: Normal rate, regular rhythm, normal heart sounds and intact distal pulses. Exam reveals no gallop and no friction rub. No murmur heard. Pulmonary/Chest: Effort normal and breath sounds normal. He has no wheezes. He has no rhonchi. He has no rales. Abdominal: Soft. Musculoskeletal: Normal range of motion. Neurological: He is alert and oriented to person, place, and time. Coordination normal.   Skin: Skin is warm and dry. Psychiatric: He has a normal mood and affect. His behavior is normal. Judgment and thought content normal.       ASSESSMENT and PLAN  Diagnoses and all orders for this visit:    1. Essential hypertension  -     amLODIPine (NORVASC) 5 mg tablet; Take 1 Tab by mouth daily.  -     METABOLIC PANEL, COMPREHENSIVE; Future  -     CBC WITH AUTOMATED DIFF; Future  -     LIPID PANEL; Future  -     TSH 3RD GENERATION; Future    2. Encounter for immunization  -     Influenza virus vaccine (QUADRIVALENT PRES FREE SYRINGE) IM (04722)    3. Screening for prostate cancer  -     PSA SCREENING (SCREENING); Future    4. Hyperlipidemia, unspecified hyperlipidemia type  -     METABOLIC PANEL, COMPREHENSIVE; Future  -     CBC WITH AUTOMATED DIFF; Future  -     LIPID PANEL; Future    5. Nausea  -     ondansetron (ZOFRAN ODT) 4 mg disintegrating tablet; Take 1 Tab by mouth every eight (8) hours as needed for Nausea.       Follow-up Disposition: as indicated

## 2017-11-20 NOTE — PROGRESS NOTES
Altaf Li 56 y.o. male   Chief Complaint   Patient presents with    Follow-up    COPD    Hypertension    Medication Refill         1. Have you been to the ER, urgent care clinic since your last visit? Hospitalized since your last visit? No    2. Have you seen or consulted any other health care providers outside of the [address] [place] [address] since your last visit? Include any pap smears or colon screening.  No

## 2017-11-20 NOTE — MR AVS SNAPSHOT
Visit Information Date & Time Provider Department Dept. Phone Encounter #  
 11/20/2017 10:45 AM Lara Abreu MD [address] [phone] Upcoming Health Maintenance Date Due Hepatitis C Screening 1960 DTaP/Tdap/Td series (1 - Tdap) 3/31/1983 FOBT Q 1 YEAR AGE 50-75 3/31/2012 Allergies as of 11/20/2017  Review Complete On: 11/20/2017 By: Lara Abreu MD  
 No Known Allergies Current Immunizations  Never Reviewed Name Date Influenza Vaccine (Quad) PF 11/20/2017, 2/9/2017 Pneumococcal Polysaccharide (PPSV-23) 2/9/2017 Not reviewed this visit You Were Diagnosed With   
  
 Codes Comments Essential hypertension    -  Primary ICD-10-CM: I10 
ICD-9-CM: 401.9 Encounter for immunization     ICD-10-CM: J80 ICD-9-CM: V03.89 Screening for prostate cancer     ICD-10-CM: Z12.5 ICD-9-CM: V76.44 Hyperlipidemia, unspecified hyperlipidemia type     ICD-10-CM: E78.5 ICD-9-CM: 272.4 Nausea     ICD-10-CM: R11.0 ICD-9-CM: 787.02 Vitals BP Pulse Temp Resp Height(growth percentile) Weight(growth percentile) 139/68 82 97.2 °F (36.2 °C) (Oral) 18 6' (1.829 m) 165 lb (74.8 kg) SpO2 BMI Smoking Status 96% 22.38 kg/m2 Current Every Day Smoker BMI and BSA Data Body Mass Index Body Surface Area  
 22.38 kg/m 2 1.95 m 2 Preferred Pharmacy Pharmacy Name Phone [facility]nal[facility] [address] [place] [address] Your Updated Medication List  
  
   
This list is accurate as of: 11/20/17  4:27 PM.  Always use your most recent med list.  
  
  
  
  
 * albuterol 2.5 mg /3 mL (0.083 %) nebulizer solution Commonly known as:  PROVENTIL VENTOLIN  
3 mL by Nebulization route every four (4) hours as needed for Wheezing. * VENTOLIN HFA 90 mcg/actuation inhaler Generic drug:  albuterol Take 2 Puffs by inhalation every four (4) hours as needed for Wheezing. amLODIPine 5 mg tablet Commonly known as:  Horris Bills Take 1 Tab by mouth daily. budesonide 180 mcg/actuation Aepb inhaler Commonly known as:  PULMICORT Take 2 Puffs by inhalation two (2) times a day. diclofenac EC 75 mg EC tablet Commonly known as:  VOLTAREN  
75 mg two (2) times a day.  
  
 gabapentin 300 mg capsule Commonly known as:  NEURONTIN Take 300mg by mouth each morning; take 600mg by mouth each evening. ondansetron 4 mg disintegrating tablet Commonly known as:  ZOFRAN ODT Take 1 Tab by mouth every eight (8) hours as needed for Nausea. OTHER Lumbar DDS Traction Belt  
  
 oxyCODONE-acetaminophen  mg per tablet Commonly known as:  PERCOCET 10 Take 1 Tab by mouth two (2) times daily as needed for Pain. Max Daily Amount: 2 Tabs. * Notice: This list has 2 medication(s) that are the same as other medications prescribed for you. Read the directions carefully, and ask your doctor or other care provider to review them with you. Prescriptions Sent to Pharmacy Refills  
 amLODIPine (NORVASC) 5 mg tablet 5 Sig: Take 1 Tab by mouth daily. Class: Normal  
 Pharmacy: [address], [place] Ph #: [phone] Route: Oral  
 ondansetron (ZOFRAN ODT) 4 mg disintegrating tablet 0 Sig: Take 1 Tab by mouth every eight (8) hours as needed for Nausea. Class: Normal  
 Pharmacy: [address], [place] Ph #: [phone] Route: Oral  
  
We Performed the Following INFLUENZA VIRUS VAC QUAD,SPLIT,PRESV FREE SYRINGE IM [id number] CPT(R)] Patient Instructions Please contact our office if you have any questions about your visit today. Introducing [facility]!    
 Ralf Moya introduces [employer] patient portal. Now you can access parts of your medical record, email your doctor's office, and request medication refills online. 1. In your internet browser, go to [email or web address] 2. Click on the First Time User? Click Here link in the Sign In box. You will see the New Member Sign Up page. 3. Enter your [employer] Access Code exactly as it appears below. You will not need to use this code after youve completed the sign-up process. If you do not sign up before the expiration date, you must request a new code. · MyChart Access Code: [facility] Expires: 12/21/2017 10:54 AM 
 
4. Enter the last four digits of your Social Security Number (xxxx) and Date of Birth (mm/dd/yyyy) as indicated and click Submit. You will be taken to the next sign-up page. 5. Create a [employer] ID. This will be your [employer] login ID and cannot be changed, so think of one that is secure and easy to remember. 6. Create a [employer] password. You can change your password at any time. 7. Enter your Password Reset Question and Answer. This can be used at a later time if you forget your password. 8. Enter your e-mail address. You will receive e-mail notification when new information is available in [address] 9. Click Sign Up. You can now view and download portions of your medical record. 10. Click the Download Summary menu link to download a portable copy of your medical information. If you have questions, please visit the Frequently Asked Questions section of the [employer] website. Remember, [employer] is NOT to be used for urgent needs. For medical emergencies, dial 911. Now available from your iPhone and Android! Please provide this summary of care documentation to your next provider. Your primary care clinician is listed as Carrie Lomax. If you have any questions after today's visit, please call [phone].

## 2017-12-06 ENCOUNTER — TELEPHONE (OUTPATIENT)
Dept: FAMILY MEDICINE CLINIC | Age: 57
End: 2017-12-06

## 2017-12-06 NOTE — TELEPHONE ENCOUNTER
----- Message from Bao Traore MD sent at 11/29/2017 11:16 PM EST -----  Please call pt to see if his nausea resolved. His labs did not shed any light on the cause of his sx. If still present, would consider imaging vs referral based on current sx.